# Patient Record
Sex: FEMALE | Race: WHITE | NOT HISPANIC OR LATINO | Employment: OTHER | URBAN - METROPOLITAN AREA
[De-identification: names, ages, dates, MRNs, and addresses within clinical notes are randomized per-mention and may not be internally consistent; named-entity substitution may affect disease eponyms.]

---

## 2019-08-09 ENCOUNTER — OFFICE VISIT CONVERTED (OUTPATIENT)
Dept: INTERNAL MEDICINE | Facility: CLINIC | Age: 69
End: 2019-08-09
Attending: NURSE PRACTITIONER

## 2019-08-09 ENCOUNTER — HOSPITAL ENCOUNTER (OUTPATIENT)
Dept: CARDIOLOGY | Facility: HOSPITAL | Age: 69
Discharge: HOME | End: 2019-08-09
Attending: NURSE PRACTITIONER

## 2019-08-09 ENCOUNTER — CONVERSION ENCOUNTER (OUTPATIENT)
Dept: INTERNAL MEDICINE | Facility: CLINIC | Age: 69
End: 2019-08-09

## 2019-08-12 ENCOUNTER — OFFICE VISIT CONVERTED (OUTPATIENT)
Dept: INTERNAL MEDICINE | Facility: CLINIC | Age: 69
End: 2019-08-12
Attending: NURSE PRACTITIONER

## 2019-08-21 ENCOUNTER — OFFICE VISIT CONVERTED (OUTPATIENT)
Dept: INTERNAL MEDICINE | Facility: CLINIC | Age: 69
End: 2019-08-21
Attending: NURSE PRACTITIONER

## 2019-08-21 ENCOUNTER — HOSPITAL ENCOUNTER (OUTPATIENT)
Dept: OTHER | Facility: HOSPITAL | Age: 69
Discharge: HOME | End: 2019-08-21
Attending: NURSE PRACTITIONER

## 2019-08-23 LAB — BACTERIA UR CULT: NORMAL

## 2019-10-14 ENCOUNTER — HOSPITAL ENCOUNTER (OUTPATIENT)
Dept: OTHER | Facility: HOSPITAL | Age: 69
Discharge: HOME | End: 2019-10-14
Attending: NURSE PRACTITIONER

## 2019-10-14 ENCOUNTER — OFFICE VISIT CONVERTED (OUTPATIENT)
Dept: INTERNAL MEDICINE | Facility: CLINIC | Age: 69
End: 2019-10-14
Attending: NURSE PRACTITIONER

## 2019-10-14 LAB
ALBUMIN SERPL-MCNC: 4.6 G/DL (ref 3.5–5)
ALBUMIN/GLOB SERPL: 1.4 {RATIO} (ref 1.4–2.6)
ALP SERPL-CCNC: 86 U/L (ref 43–160)
ALT SERPL-CCNC: 26 U/L (ref 10–40)
ANION GAP SERPL CALC-SCNC: 22 MMOL/L (ref 8–19)
AST SERPL-CCNC: 22 U/L (ref 15–50)
BILIRUB SERPL-MCNC: 0.54 MG/DL (ref 0.2–1.3)
BUN SERPL-MCNC: 16 MG/DL (ref 5–25)
BUN/CREAT SERPL: 22 {RATIO} (ref 6–20)
CALCIUM SERPL-MCNC: 9.8 MG/DL (ref 8.7–10.4)
CHLORIDE SERPL-SCNC: 99 MMOL/L (ref 99–111)
CHOLEST SERPL-MCNC: 140 MG/DL (ref 107–200)
CHOLEST/HDLC SERPL: 2 {RATIO} (ref 3–6)
CONV CO2: 26 MMOL/L (ref 22–32)
CONV TOTAL PROTEIN: 7.8 G/DL (ref 6.3–8.2)
CREAT UR-MCNC: 0.72 MG/DL (ref 0.5–0.9)
EST. AVERAGE GLUCOSE BLD GHB EST-MCNC: 151 MG/DL
GFR SERPLBLD BASED ON 1.73 SQ M-ARVRAT: >60 ML/MIN/{1.73_M2}
GLOBULIN UR ELPH-MCNC: 3.2 G/DL (ref 2–3.5)
GLUCOSE SERPL-MCNC: 173 MG/DL (ref 65–99)
HBA1C MFR BLD: 6.9 % (ref 3.5–5.7)
HDLC SERPL-MCNC: 71 MG/DL (ref 40–60)
LDLC SERPL CALC-MCNC: 46 MG/DL (ref 70–100)
OSMOLALITY SERPL CALC.SUM OF ELEC: 301 MOSM/KG (ref 273–304)
POTASSIUM SERPL-SCNC: 4.3 MMOL/L (ref 3.5–5.3)
SODIUM SERPL-SCNC: 143 MMOL/L (ref 135–147)
T4 FREE SERPL-MCNC: 1.3 NG/DL (ref 0.9–1.8)
TRIGL SERPL-MCNC: 116 MG/DL (ref 40–150)
TSH SERPL-ACNC: 1.83 M[IU]/L (ref 0.27–4.2)
VLDLC SERPL-MCNC: 23 MG/DL (ref 5–37)

## 2020-01-14 ENCOUNTER — HOSPITAL ENCOUNTER (OUTPATIENT)
Dept: OTHER | Facility: HOSPITAL | Age: 70
Discharge: HOME | End: 2020-01-14
Attending: NURSE PRACTITIONER

## 2020-01-14 ENCOUNTER — CONVERSION ENCOUNTER (OUTPATIENT)
Dept: INTERNAL MEDICINE | Facility: CLINIC | Age: 70
End: 2020-01-14

## 2020-01-14 ENCOUNTER — OFFICE VISIT CONVERTED (OUTPATIENT)
Dept: INTERNAL MEDICINE | Facility: CLINIC | Age: 70
End: 2020-01-14
Attending: NURSE PRACTITIONER

## 2020-03-19 ENCOUNTER — CONVERSION ENCOUNTER (OUTPATIENT)
Dept: CARDIOLOGY | Facility: CLINIC | Age: 70
End: 2020-03-19

## 2020-03-19 ENCOUNTER — OFFICE VISIT CONVERTED (OUTPATIENT)
Dept: CARDIOLOGY | Facility: CLINIC | Age: 70
End: 2020-03-19
Attending: INTERNAL MEDICINE

## 2020-05-12 LAB — SARS-COV-2 RNA SPEC QL NAA+PROBE: NOT DETECTED

## 2020-05-13 ENCOUNTER — HOSPITAL ENCOUNTER (OUTPATIENT)
Dept: GASTROENTEROLOGY | Facility: HOSPITAL | Age: 70
Setting detail: HOSPITAL OUTPATIENT SURGERY
Discharge: HOME | End: 2020-05-13
Attending: INTERNAL MEDICINE

## 2020-05-13 LAB — GLUCOSE BLD-MCNC: 188 MG/DL (ref 65–99)

## 2020-07-21 ENCOUNTER — OFFICE VISIT CONVERTED (OUTPATIENT)
Dept: INTERNAL MEDICINE | Facility: CLINIC | Age: 70
End: 2020-07-21
Attending: NURSE PRACTITIONER

## 2020-07-21 ENCOUNTER — HOSPITAL ENCOUNTER (OUTPATIENT)
Dept: OTHER | Facility: HOSPITAL | Age: 70
Discharge: HOME | End: 2020-07-21
Attending: NURSE PRACTITIONER

## 2020-07-21 LAB
ALBUMIN SERPL-MCNC: 4.5 G/DL (ref 3.5–5)
ALBUMIN/GLOB SERPL: 1.5 {RATIO} (ref 1.4–2.6)
ALP SERPL-CCNC: 91 U/L (ref 43–160)
ALT SERPL-CCNC: 40 U/L (ref 10–40)
ANION GAP SERPL CALC-SCNC: 19 MMOL/L (ref 8–19)
AST SERPL-CCNC: 38 U/L (ref 15–50)
BASOPHILS # BLD AUTO: 0.02 10*3/UL (ref 0–0.2)
BASOPHILS NFR BLD AUTO: 0.3 % (ref 0–3)
BILIRUB SERPL-MCNC: 0.54 MG/DL (ref 0.2–1.3)
BUN SERPL-MCNC: 14 MG/DL (ref 5–25)
BUN/CREAT SERPL: 18 {RATIO} (ref 6–20)
CALCIUM SERPL-MCNC: 9.8 MG/DL (ref 8.7–10.4)
CHLORIDE SERPL-SCNC: 99 MMOL/L (ref 99–111)
CHOLEST SERPL-MCNC: 150 MG/DL (ref 107–200)
CHOLEST/HDLC SERPL: 2.5 {RATIO} (ref 3–6)
CONV ABS IMM GRAN: 0.01 10*3/UL (ref 0–0.2)
CONV CO2: 24 MMOL/L (ref 22–32)
CONV IMMATURE GRAN: 0.2 % (ref 0–1.8)
CONV TOTAL PROTEIN: 7.5 G/DL (ref 6.3–8.2)
CREAT UR-MCNC: 0.77 MG/DL (ref 0.5–0.9)
DEPRECATED RDW RBC AUTO: 39.4 FL (ref 36.4–46.3)
EOSINOPHIL # BLD AUTO: 0.07 10*3/UL (ref 0–0.7)
EOSINOPHIL # BLD AUTO: 1.1 % (ref 0–7)
ERYTHROCYTE [DISTWIDTH] IN BLOOD BY AUTOMATED COUNT: 12.5 % (ref 11.7–14.4)
EST. AVERAGE GLUCOSE BLD GHB EST-MCNC: 192 MG/DL
GFR SERPLBLD BASED ON 1.73 SQ M-ARVRAT: >60 ML/MIN/{1.73_M2}
GLOBULIN UR ELPH-MCNC: 3 G/DL (ref 2–3.5)
GLUCOSE SERPL-MCNC: 213 MG/DL (ref 65–99)
HBA1C MFR BLD: 8.3 % (ref 3.5–5.7)
HCT VFR BLD AUTO: 40.8 % (ref 37–47)
HDLC SERPL-MCNC: 61 MG/DL (ref 40–60)
HGB BLD-MCNC: 13.8 G/DL (ref 12–16)
LDLC SERPL CALC-MCNC: 54 MG/DL (ref 70–100)
LYMPHOCYTES # BLD AUTO: 2.5 10*3/UL (ref 1–5)
LYMPHOCYTES NFR BLD AUTO: 38.8 % (ref 20–45)
MCH RBC QN AUTO: 29.5 PG (ref 27–31)
MCHC RBC AUTO-ENTMCNC: 33.8 G/DL (ref 33–37)
MCV RBC AUTO: 87.2 FL (ref 81–99)
MONOCYTES # BLD AUTO: 0.59 10*3/UL (ref 0.2–1.2)
MONOCYTES NFR BLD AUTO: 9.2 % (ref 3–10)
NEUTROPHILS # BLD AUTO: 3.25 10*3/UL (ref 2–8)
NEUTROPHILS NFR BLD AUTO: 50.4 % (ref 30–85)
NRBC CBCN: 0 % (ref 0–0.7)
OSMOLALITY SERPL CALC.SUM OF ELEC: 293 MOSM/KG (ref 273–304)
PLATELET # BLD AUTO: 210 10*3/UL (ref 130–400)
PMV BLD AUTO: 12.8 FL (ref 9.4–12.3)
POTASSIUM SERPL-SCNC: 3.9 MMOL/L (ref 3.5–5.3)
RBC # BLD AUTO: 4.68 10*6/UL (ref 4.2–5.4)
SODIUM SERPL-SCNC: 138 MMOL/L (ref 135–147)
T4 FREE SERPL-MCNC: 1.2 NG/DL (ref 0.9–1.8)
TRIGL SERPL-MCNC: 177 MG/DL (ref 40–150)
TSH SERPL-ACNC: 1.79 M[IU]/L (ref 0.27–4.2)
VLDLC SERPL-MCNC: 35 MG/DL (ref 5–37)
WBC # BLD AUTO: 6.44 10*3/UL (ref 4.8–10.8)

## 2020-07-22 LAB
CONV CREATININE URINE, RANDOM: 34.8 MG/DL (ref 10–300)
CONV MICROALBUM.,U,RANDOM: <12 MG/L (ref 0–20)
MICROALBUMIN/CREAT UR: 34.5 MG/G{CRE} (ref 0–35)

## 2020-10-21 ENCOUNTER — HOSPITAL ENCOUNTER (OUTPATIENT)
Dept: GENERAL RADIOLOGY | Facility: HOSPITAL | Age: 70
Discharge: HOME | End: 2020-10-21
Attending: NURSE PRACTITIONER

## 2020-10-22 ENCOUNTER — CONVERSION ENCOUNTER (OUTPATIENT)
Dept: CARDIOLOGY | Facility: CLINIC | Age: 70
End: 2020-10-22

## 2020-10-22 ENCOUNTER — CONVERSION ENCOUNTER (OUTPATIENT)
Dept: CARDIOLOGY | Facility: CLINIC | Age: 70
End: 2020-10-22
Attending: INTERNAL MEDICINE

## 2020-10-22 ENCOUNTER — OFFICE VISIT CONVERTED (OUTPATIENT)
Dept: CARDIOLOGY | Facility: CLINIC | Age: 70
End: 2020-10-22
Attending: INTERNAL MEDICINE

## 2021-01-22 ENCOUNTER — HOSPITAL ENCOUNTER (OUTPATIENT)
Dept: URGENT CARE | Facility: CLINIC | Age: 71
Discharge: HOME | End: 2021-01-22
Attending: NURSE PRACTITIONER

## 2021-01-23 LAB — SARS-COV-2 RNA SPEC QL NAA+PROBE: DETECTED

## 2021-01-26 ENCOUNTER — TELEMEDICINE CONVERTED (OUTPATIENT)
Dept: INTERNAL MEDICINE | Facility: CLINIC | Age: 71
End: 2021-01-26
Attending: NURSE PRACTITIONER

## 2021-02-02 ENCOUNTER — OFFICE VISIT CONVERTED (OUTPATIENT)
Dept: INTERNAL MEDICINE | Facility: CLINIC | Age: 71
End: 2021-02-02
Attending: PHYSICIAN ASSISTANT

## 2021-02-02 ENCOUNTER — HOSPITAL ENCOUNTER (OUTPATIENT)
Dept: OTHER | Facility: HOSPITAL | Age: 71
Discharge: HOME | End: 2021-02-02
Attending: PHYSICIAN ASSISTANT

## 2021-05-10 NOTE — PROCEDURES
"   Procedure Note      Patient Name: Jeimy Estevez   Patient ID: [id number]   Sex: Female   YOB: 1950    Primary Care Provider: Minoo LOOMIS   Referring Provider: Minoo LOOMIS    Visit Date: October 22, 2020    Provider: Leonel Triplett MD   Location: [facility]   Location Address: [address] [place] [address], [address]   [place]  [id number]   Location Phone: [phone]          FINAL REPORT   TRANSTHORACIC ECHOCARDIOGRAM REPORT    Diagnosis: CHF (Congestive Heart Failure)   Height: 5'4\" Weight: 195 B/P: 116/56 BSA: 1.9   Tech: BNS   MEASUREMENTS:  RVID (Diastole) : RVID. (NORMAL: 0.7 to 2.4 cm max)   LVID (Systole): 2.8 cm (Diastole): 4.6 cm. (NORMAL: 3.7 - 5.4 cm)   Posterior Wall Thickness (Diastole): 1.3 cm. (NORMAL: 0.8 - 1.1 cm)   Septal Thickness (Diastole): 1.4 cm. (NORMAL: 0.7 - 1.2 cm)   LAID (Systole): 4.1 cm. (NORMAL: 1.9 - 3.8 cm)   Aortic Root Diameter (Diastole): 3.0 cm. (NORMAL: 2.0 - 3.7 cm)   DOPPLER: Continuous-wave, pulse-wave, and color-flow examination of the mitral, aortic, and tricuspid valves was performed. There was trace tricuspid regurgitation with normal estimated PA pressures. No significant stenosis was identified. Doppler flow velocities were normal. E/A ratio is 1.1. DT= 169 msec. IVRT is 56 msec. E/E' is 14.   COMMENTS:  The patient underwent 2-D, M-Mode, and Doppler examination, including pulse-wave, continuous-wave, and color-flow analysis; the study is technically adequate.   FINDINGS:  AORTIC VALVE: Appeared to be normal. Trileaflet with central closure point. No evidence of any obstruction. No regurgitation.   MITRAL VALVE: Appeared to be normal. No evidence of any obstruction. No regurgitation.   TRICUSPID VALVE: Appeared to be normal.   PULMONIC VALVE: Not well seen.   LEFT ATRIUM: Mildly enlarged. LA volume index is 21 mL/m2.   AORTIC ROOT: Appeared to be normal in size.   LEFT VENTRICLE: Appeared to have an overall normal left ventricular " systolic function with a normal EF of 55% with mild left ventricular hypertrophy.   RIGHT ATRIUM: Appeared to be normal; no obvious evidence of intracavity masses or clots.   RIGHT VENTRICLE: Normal size and function.   PERICARDIUM: Unremarkable. No evidence of effusion.   INFERIOR VENA CAVA: Diameter is 1.5 cm.   Fax: 10/26/2020      CONCLUSION:  1.  Normal ejection fraction of 55%.   2.  Mild left ventricular hypertrophy.   3.  Mild left atrial enlargement.   4.  Trace tricuspid regurgitation.      MD ELIZABETH Curtis/vamshi    This note was transcribed by Amaya Saez.  vamshi/elizabeth  The above service was transcribed by Amaya Saez, and I attest to the accuracy of the note.                   Electronically Signed by: Carmelita Saez-, Other -Author on October 26, 2020 09:55:29 AM  Electronically Co-signed by: Leonel Triplett MD -Reviewer on October 28, 2020 05:36:26 PM

## 2021-05-13 NOTE — PROGRESS NOTES
"   Progress Note      Patient Name: Jeimy Estevez   Patient ID: [id number]   Sex: Female   YOB: 1950    Primary Care Provider: Minoo LOOMIS   Referring Provider: Minoo LOOMIS    Visit Date: October 22, 2020    Provider: Leonel Triplett MD   Location: [facility]   Location Address: [address] [place] [address], [address]   [place]  [id number]   Location Phone: [phone]          Chief Complaint     Hypertension.  Congestive heart failure.       History Of Present Illness  REFERRING CARE PROVIDER: Minoo LOOMIS   Jeimy Estevez is a 70 year old /White female with congestive heart failure, diabetes, and underlying hypertension who has been doing well. She has had stable mild lower extremity edema. No problems with shortness of breath or weight gain.   PAST MEDICAL HISTORY: Diabetes mellitus; Dyslipidemia; Hypertension; Polyps.   PSYCHOSOCIAL HISTORY: Denies tobacco use. Denies alcohol use.   CURRENT MEDICATIONS: Metformin 500 mg b.i.d.; nifedipine 60 mg daily simvastatin 40 mg daily; atenolol 50 mg daily; furosemide 40 mg b.i.d.; glimepiride 4 mg b.i.d.; Tresiba 10 units.       Review of Systems  · Cardiovascular  o Admits  o : swelling (feet, ankles, hands)  o Denies  o : palpitations (fast, fluttering, or skipping beats), shortness of breath while walking or lying flat, chest pain or angina pectoris   · Respiratory  o Denies  o : chronic or frequent cough      Vitals  Date Time BP Position Site L\R Cuff Size HR RR TEMP (F) WT  HT  BMI kg/m2 BSA m2 O2 Sat FR L/min FiO2 HC       10/22/2020 10:45 /56 Sitting    64 - R   194lbs 16oz 5'  4\" 33.47 2             Physical Examination  · Constitutional  o Appearance  o : Awake, alert, in no acute distress.   · Eyes  o Conjunctivae  o : Normal.  · Ears, Nose, Mouth and Throat  o Oral Cavity  o :   § Oral Mucosa  § : Normal.  · Neck  o Inspection/Palpation  o : No JVD. Good carotid upstroke. No " thyromegaly.  · Respiratory  o Respiratory  o : Good respiratory effort. Clear to percussion and auscultation.  · Cardiovascular  o Heart  o :   § Auscultation of Heart  § : S1, S2 normal. Regular rate and rhythm without murmurs, gallops, or rubs.  o Peripheral Vascular System  o :   § Extremities  § : Trace lower extremity edema.   · Gastrointestinal  o Abdominal Examination  o : Soft. No tenderness or masses felt. No hepatosplenomegaly. Abdominal aorta is not palpable.          Assessment     ASSESSMENT & PLAN:    1.  Congestive heart failure, by report, possibly diastolic in nature.  No evidence of volume overload.         Echocardiogram pending today.    2.  Hypertension, controlled.  3.  Dyslipidemia.  4.  Diabetes.        Leonel Triplett MD  JH:vm             Electronically Signed by: Aarti Holbrook-, Other -Author on October 27, 2020 08:03:58 AM  Electronically Co-signed by: Leonel Triplett MD -Reviewer on October 28, 2020 05:34:14 PM

## 2021-05-13 NOTE — PROGRESS NOTES
Progress Note      Patient Name: Jeimy Estevez   Patient ID: [id number]   Sex: Female   YOB: 1950    Primary Care Provider: Minoo LOOMIS   Referring Provider: Minoo LOOMIS    Visit Date: July 21, 2020    Provider: EBONIE Jonse   Location: [facility]   Location Address: [address] [place] [address], [address]  [place], [place]  [id number]   Location Phone: [phone]          Chief Complaint  · Follow up  · Diabetes   · Hyperlipidemia       History Of Present Illness  Jeimy Estevez is a 70 year old /White female who presents for evaluation and treatment of:      HTN-  Managed with atenolol and nifedipine. Reports home blood pressure readings 120s/60s. Denies chest pain, blurry vision, headache.     HLD-  Managed with statin, well tolerated. Denies leg cramping. Most recent LDL 46.    CHF-  Managed with Lasix, statin, BB. Dependent edema. Denies shortness of breath, weight gain, orthopnea.    DM2-  Managed by [facility], last visit 3/2020. Most recent HgbA1c 6.8. Reports fasting blood glucose readings 150s. Due to schedule diabetic eye exam, postponed due to COVID19 pandemic. Due for diabetic foot exam and urine microalbumin. Patient not on ACE/ARB, defers as she would like to discuss with [facility]. Considering pneumonia vaccine, would also like to schedule with [facility].       Past Medical History  Disease Name Date Onset Notes   CHF (congestive heart failure) 10/14/2019 --    Colon polyps 10/14/2019 --    Diabetes --  --    Diabetes mellitus, type 2 10/14/2019 --    Essential hypertension 10/14/2019 --    Hyperlipidemia --  --    Hypertension --  --          Past Surgical History  Procedure Name Date Notes   Appendectomy --  --    Colonoscopy 2014 2020 [place], History of Colon Polyps   Gallbladder --  --    Tonsilectomy --  --    Tubal ligation --  --          Medication List  Name Date Started Instructions   atenolol 50 mg oral tablet 07/21/2020  take 1 tablet (50 mg) by oral route once daily   biotin 5,000 mcg oral tablet,disintegrating  dissolve 1 tablet by oral route daily   Blood Glucose Monitoring miscellaneous kit 01/14/2020 use as directed   Blood Glucose Test miscellaneous strip 01/14/2020 use as directed   furosemide 40 mg oral tablet 10/14/2019 take 1 tablet (40 mg) by oral route 2 times per day for 30 days   glimepiride oral  --    ibuprofen 600 mg oral tablet  take 1 tablet (600 mg) by oral route every 8 hours as needed with food   lancets miscellaneous misc 01/14/2020 use as directed   metformin 1,000 mg oral tablet  take 1 tablet by oral route daily   nifedipine 60 mg oral tablet extended release 01/08/2020 take 1 tablet by oral route once daily for 30 days   NuLYTELY with Flavor Packs 420 gram oral recon soln 03/23/2020 take as directed for 1 day   Ozempic 0.25 mg or 0.5 mg(2 mg/1.5 mL) subcutaneous pen injector  inject 0.25 mg by subcutaneous route once weekly on the same day of each week, in the abdomen, thighs, or upper arm rotating injection sites   simvastatin 40 mg oral tablet 05/19/2020 TAKE 1 TABLET BY MOUTH EVERY DAY         Allergy List  Allergen Name Date Reaction Notes   NO KNOWN DRUG ALLERGIES --  --  --          Family Medical History  Disease Name Relative/Age Notes   Heart Disease  --    Cancer, Unspecified  --    No family history of colorectal cancer  --          Social History  Finding Status Start/Stop Quantity Notes   Alcohol Never --/-- --  --    Tobacco Never --/-- --  --          Review of Systems  · Constitutional  o Denies  o : fever, fatigue, weight loss, weight gain  · Eyes  o Denies  o : discharge from eye, impaired vision, blurred vision  · HENT  o Denies  o : headaches, vertigo, lightheadedness  · Cardiovascular  o Admits  o : lower extremity edema  o Denies  o : chest pressure, palpitations  · Respiratory  o Denies  o : shortness of breath, wheezing, cough, dyspnea on exertion  · Gastrointestinal  o Denies  o :  "nausea, vomiting, diarrhea, constipation, abdominal pain  · Psychiatric  o Denies  o : anxiety, depression, suicidal ideation, homicidal ideation      Vitals  Date Time BP Position Site L\R Cuff Size HR RR TEMP (F) WT  HT  BMI kg/m2 BSA m2 O2 Sat HC       03/19/2020 02:07 PM 96/64 Sitting    72 - R   201lbs 0oz 5'  4\" 34.5 2.03     03/19/2020 02:07 /64 Sitting               07/21/2020 11:41 /74 Sitting    66 - R  97.3 196lbs 8oz 5'  4\" 33.73 2.01 96 %          Physical Examination  · Constitutional  o Appearance  o : no acute distress, well-nourished  · Head and Face  o Head  o :   § Inspection  § : atraumatic, normocephalic  · Ears, Nose, Mouth and Throat  o Ears  o :   § External Ears  § : normal  o Nose  o :   § Intranasal Exam  § : nares patent  o Oral Cavity  o :   § Oral Mucosa  § : moist mucous membranes  · Neck  o Thyroid  o : gland size normal, nontender, no nodules or masses present on palpation, thyroid motion normal during swallowing  · Respiratory  o Respiratory Effort  o : breathing comfortably, symmetric chest rise  o Auscultation of Lungs  o : clear to asculatation bilaterally, no wheezes, rales, or rhonchii  · Cardiovascular  o Heart  o :   § Auscultation of Heart  § : regular rate and rhythm, no murmurs, rubs, or gallops  o Peripheral Vascular System  o :   § Extremities  § : trace, nonpitting lower extremity edema  · Lymphatic  o Neck  o : no lymphadenopathy present  o Supraclavicular Nodes  o : no supraclavicular nodes  · Skin and Subcutaneous Tissue  o General Inspection  o : no rashes present, no lesions present, no areas of discoloration  · Neurologic  o Mental Status Examination  o :   § Orientation  § : grossly oriented to person, place and time  o Gait and Station  o :   § Gait Screening  § : normal gait      Figure 1.0: Diabetic Foot Screen             Assessment  · Visit for screening mammogram     V76.12/Z12.31  Order placed for mammogram.  · CHF (congestive heart " failure)     428.0/I50.9  Continue Lasix, statin, BB. Could consider echo in the future as patient is not sure if she has ever had one, does not see cardiology. Call or return to clinic with worsening lower extremity edema, shortness of breath, orthopnea, weight gain. Will continue to monitor.   · Diabetes mellitus, type 2     250.00/E11.9  Well controlled on previous labs. Continue metformin, Ozempic, glimepiride. Encouraged patient to continue to monitor blood glucose levels and to call with consistent elevations/lows. Labs, diabetic foot exam and urine microalbumin in clinic today. She is to schedule diabetic eye exam and will discuss ACE/ARB and pneumonia vaccine with [place] at next follow up appointment. Discussed renal risks with DM2. Will continue to monitor.   · Essential hypertension     401.9/I10  Well controlled, continue atenolol and nifedipine. Encouraged patient to continue to monitor blood pressure at home and to call with consistent elevations. Labs in clinic today. Will continue to monitor.   · Hyperlipidemia     272.4/E78.5  Well controlled, continue statin. Most recent LDL 46. Lipid panel in clinic today.   · Postmenopausal     V49.81/Z78.0  Order placed for DEXA scan.     Problems Reconciled  Plan  · Orders  o Screening Mammography; Bilateral 3D (36217, , 53544) - V76.12/Z12.31 - 07/21/2020  o CBC with Auto Diff [facility] (93530) - 250.00/E11.9 - 07/21/2020  o CMP [facility] (54846) - 250.00/E11.9 - 07/21/2020  o Hgb A1c [facility] (66310) - 250.00/E11.9 - 07/21/2020  o Lipid Panel [facility] (30491) - 250.00/E11.9 - 07/21/2020  o Urine microalbumin (52924) - 250.00/E11.9 - 07/21/2020  o Thyroid Profile (95483, 01664, THYII) - 250.00/E11.9 - 07/21/2020  o Diabetic Foot (Motor and Sensory) Exam Completed [facility] (, , 2028F) - 250.00/E11.9 - 07/21/2020  o ACO-39: Current medications updated and reviewed () - - 07/21/2020  o ACO-14: Influenza immunization administered or previously received () - -  07/21/2020  o ACO-19: Colorectal cancer screening results documented and reviewed (3017F) - - 07/21/2020  o DEXA Bone Density, 1 or more sites, axial skeleton [facility] (79530) - V49.81/Z78.0 - 07/21/2020  · Medications  o atenolol 50 mg oral tablet   SIG: take 1 tablet (50 mg) by oral route once daily   DISP: (90) tablet with 1 refills  Adjusted on 07/21/2020     o Medications have been Reconciled  o Transition of Care or Provider Policy  · Instructions  o Continue blood sugar monitoring daily and record. Bring your log to office visits. Call the office for readings below 70 and above 250 or any complications.  o Daily foot care. Avoid walking barefoot. Annual Dilated Eye Exam.  o Discussed with patient blood pressure monitoring, hemoglobin A1C levels need to be below 7.0, and LDL (Lipid) goals below 70.  o Patient advised to monitor blood pressure (B/P) at home and journal readings. Patient informed that a B/P reading at home of more than 130/80 is considered hypertension. For readings greater [id number]/90 or higher patient is advised to follow up in the office with readings for management. Patient advised to limit sodium intake.  o Advised that cheeses and other sources of dairy fats, animal fats, fast food, and the extras (candy, pastries, pies, doughnuts and cookies) all contain LDL raising nutrients. Advised to increase fruits, vegetables, whole grains, and to monitor portion sizes.   o Take all medications as prescribed/directed.  o Patient was educated/instructed on their diagnosis, treatment and medications prior to discharge from the clinic today.  o Patient instructed to seek medical attention urgently for new or worsening symptoms.  o Call the office with any concerns or questions.  o Chronic conditions reviewed and taken into consideration for today's treatment plan.  · Disposition  o Call or Return if symptoms worsen or persist.  o Follow up in 6 months  o Prescriptions sent to pharmacy  o Labs drawn in  clinic  o Will call patient with results of labs  o Will call patient with results of imaging  o Imaging to be performed outside of clinic            Electronically Signed by: EBONIE Jones -Author on July 21, 2020 04:03:32 PM

## 2021-05-14 VITALS
HEIGHT: 64 IN | DIASTOLIC BLOOD PRESSURE: 56 MMHG | HEART RATE: 64 BPM | WEIGHT: 195 LBS | SYSTOLIC BLOOD PRESSURE: 116 MMHG | BODY MASS INDEX: 33.29 KG/M2

## 2021-05-14 VITALS
HEART RATE: 63 BPM | HEIGHT: 64 IN | WEIGHT: 190.12 LBS | DIASTOLIC BLOOD PRESSURE: 76 MMHG | OXYGEN SATURATION: 98 % | SYSTOLIC BLOOD PRESSURE: 118 MMHG | BODY MASS INDEX: 32.46 KG/M2 | TEMPERATURE: 97.9 F

## 2021-05-14 NOTE — PROGRESS NOTES
Progress Note      Patient Name: Jeimy Estevez   Patient ID: [id number]   Sex: Female   YOB: 1950    Primary Care Provider: Minoo LOOMIS   Referring Provider: Minoo LOOMIS    Visit Date: January 26, 2021    Provider: EBONIE Jones   Location: [facility]   Location Address: [address] [place] [address] [address]  [place], [place]  [id number]   Location Phone: [phone]          Chief Complaint  · + Covid-19      History Of Present Illness  TELEHEALTH TELEPHONE VISIT  Jeimy Estevez is a 70 year old /White female who is presenting for evaluation via telehealth telephone visit. Verbal consent obtained before beginning visit.   Provider spent 18 minutes with the patient during the telehealth visit.   The following staff were present during this visit: EBONIE Jones; Kate Rhoades CMA   Past Medical History/ Overview of Patient Symptoms     Informed patient that as visit is being performed as a Telehealth visit there will be no opportunity to obtain vital signs or perform a physical exam. Due to this there is unfortunately a possibility that things may be missed that would typically be noticed during a traditional visit. Patient is aware of this possibility and agrees to proceed with Telehealth visit. Patient states there is no other person present for this phone call.     Phone call started: [phone]  Phone call ended: 1314    Patient reports she was recently diagnosed with COVID19 pneumonia through [facility] after running a fever for multiple days. Was started on azithromycin, dexamethasone and Tessalon Perles x 3 days ago. Patient states since starting the medication she continues to have intermittent temperature elevations, last fever yesterday of 101. Current temperature 99 without temperature reducing medications. Patient with cough, loss of taste/smell. Drinking well. Plenty of urine output. Denies shortness breath, cough is not productive. Patient  has been concerned about her blood sugar readings, fasting this morning was 285. She has one dose of oral steroid left.       Past Medical History  Disease Name Date Onset Notes   CHF (congestive heart failure) 10/14/2019 --    Colon polyps 10/14/2019 --    Diabetes --  --    Diabetes mellitus, type 2 10/14/2019 --    Essential hypertension 10/14/2019 --    Hyperlipidemia --  --    Hypertension --  --    Screening for breast cancer 10/28/2020 --          Past Surgical History  Procedure Name Date Notes   Appendectomy --  --    Colonoscopy 2014 2020 [place], History of Colon Polyps   Gallbladder --  --    Tonsilectomy --  --    Tubal ligation --  --          Medication List  Name Date Started Instructions   atenolol 50 mg oral tablet 07/21/2020 take 1 tablet (50 mg) by oral route once daily   azithromycin 250 mg oral tablet  take 2 tablets (500 mg) by oral route once daily for 1 day then 1 tablet (250 mg) by oral route once daily for 4 days   biotin 5,000 mcg oral tablet,disintegrating  dissolve 1 tablet by oral route daily   Blood Glucose Monitoring miscellaneous kit 01/14/2020 use as directed   Blood Glucose Test miscellaneous strip 01/14/2020 use as directed   dexamethasone 6 mg oral tablet  take 1 tablet (6 mg) by oral route once daily   furosemide 40 mg oral tablet 09/24/2020 TAKE 1 TABLET(40 MG) BY MOUTH TWICE DAILY   glimepiride oral  --    ibuprofen 600 mg oral tablet  take 1 tablet (600 mg) by oral route every 8 hours as needed with food   lancets miscellaneous misc 01/14/2020 use as directed   metformin 1,000 mg oral tablet  take 1 tablet by oral route daily   nifedipine 60 mg oral tablet extended release 10/05/2020 take 1 tablet by oral route once daily for 90 days   simvastatin 40 mg oral tablet 11/11/2020 TAKE 1 TABLET BY MOUTH EVERY DAY   Tresiba FlexTouch U-100 100 unit/mL (3 mL) subcutaneous insulin pen  inject by subcutaneous route per prescriber's instructions. Insulin dosing requires  individualization.         Allergy List  Allergen Name Date Reaction Notes   NO KNOWN DRUG ALLERGIES --  --  --        Allergies Reconciled  Family Medical History  Disease Name Relative/Age Notes   Heart Disease  --    Cancer, Unspecified  --    No family history of colorectal cancer  --          Social History  Finding Status Start/Stop Quantity Notes   Alcohol Never --/-- --  --    Tobacco Never --/-- --  --          Physical Examination                Assessment  · Diabetes mellitus, type 2     250.00/E11.9  · COVID-19     079.89/U07.1  Continue medications as prescribed by [facility]. Discussed with patient impact of systemic steroids on blood glucose levels and risks/benefits of continuing medication. Would finish course at this time. She is to continue to monitor blood glucose levels closely and will call clinic with consistent elevations. Continue quarantine. Patient to monitor for persistent fever, cough, shortness of breath. Could consider in clinic evaluation with repeat CXR in the future if warranted. Patient and  aware of 24 hour on call service in the event that there are concerns outside of office hours.     Problems Reconciled  Plan  · Orders  o Physician Telephone Evaluation, 11-20 minutes (08444) - - 01/26/2021  · Medications  o Medications have been Reconciled  o Transition of Care or Provider Policy  · Instructions  o Chronic conditions reviewed and taken into consideration for today's treatment plan.  o Take all medications as prescribed/directed.  o Rest. Increase Fluids.  o Call the office with any concerns or questions.  · Disposition  o Call or Return if symptoms worsen or persist.            Electronically Signed by: EBONIE Jones -Author on January 26, 2021 01:33:47 PM

## 2021-05-14 NOTE — PROGRESS NOTES
Progress Note      Patient Name: Jeimy Estevez   Patient ID: [id number]   Sex: Female   YOB: 1950    Primary Care Provider: Minoo LOOMIS   Referring Provider: Minoo LOOMIS    Visit Date: February 2, 2021    Provider: Deborah Mcdonald PA-C   Location: [facility]   Location Address: [address] [place] [address]  [id number]   Location Phone: [phone]          Chief Complaint  · Follow-up  · Covid-19  · Cough      History Of Present Illness  Jeimy Estevez is a 70 year old /White female who presents for evaluation and treatment of:      nasal congestion, cough, shortness of breath.  Her symptoms have improved since they began initially after testing positive for covid on 1/22/21 but they are still bothering her some.  She was seen at [facility] and given a zpak.  She has had some fatigue.  No chest pain, difficulty breathing or fevers.            Past Medical History  Disease Name Date Onset Notes   CHF (congestive heart failure) 10/14/2019 --    Colon polyps 10/14/2019 --    Diabetes --  --    Diabetes mellitus, type 2 10/14/2019 --    Essential hypertension 10/14/2019 --    Hyperlipidemia --  --    Hypertension --  --    Screening for breast cancer 10/28/2020 --          Past Surgical History  Procedure Name Date Notes   Appendectomy --  --    Colonoscopy 2014 2020 [place], History of Colon Polyps   Gallbladder --  --    Tonsilectomy --  --    Tubal ligation --  --          Medication List  Name Date Started Instructions   atenolol 50 mg oral tablet 07/21/2020 take 1 tablet (50 mg) by oral route once daily   biotin 5,000 mcg oral tablet,disintegrating  dissolve 1 tablet by oral route daily   Blood Glucose Monitoring miscellaneous kit 01/14/2020 use as directed   Blood Glucose Test miscellaneous strip 01/14/2020 use as directed   furosemide 40 mg oral tablet 09/24/2020 TAKE 1 TABLET(40 MG) BY MOUTH TWICE DAILY   glimepiride oral  --   "  ibuprofen 600 mg oral tablet  take 1 tablet (600 mg) by oral route every 8 hours as needed with food   lancets miscellaneous misc 01/14/2020 use as directed   metformin 1,000 mg oral tablet  take 1 tablet by oral route daily   nifedipine 60 mg oral tablet extended release 10/05/2020 take 1 tablet by oral route once daily for 90 days   simvastatin 40 mg oral tablet 11/11/2020 TAKE 1 TABLET BY MOUTH EVERY DAY   Tresiba FlexTouch U-100 100 unit/mL (3 mL) subcutaneous insulin pen  inject by subcutaneous route per prescriber's instructions. Insulin dosing requires individualization.         Allergy List  Allergen Name Date Reaction Notes   NO KNOWN DRUG ALLERGIES --  --  --        Allergies Reconciled  Family Medical History  Disease Name Relative/Age Notes   Heart Disease  --    Cancer, Unspecified  --    No family history of colorectal cancer  --          Social History  Finding Status Start/Stop Quantity Notes   Alcohol Never --/-- --  --    Tobacco Never --/-- --  --          Review of Systems  · Constitutional  o Admits  o : fatigue  o Denies  o : fever, weight loss, weight gain  · HENT  o Admits  o : nasal congestion  · Cardiovascular  o Denies  o : lower extremity edema, claudication, chest pressure, palpitations  · Respiratory  o Admits  o : cough  o Denies  o : shortness of breath, wheezing, hemoptysis, dyspnea on exertion  · Gastrointestinal  o Denies  o : nausea, vomiting, diarrhea, constipation, abdominal pain      Vitals  Date Time BP Position Site L\R Cuff Size HR RR TEMP (F) WT  HT  BMI kg/m2 BSA m2 O2 Sat FR L/min FiO2 HC       07/21/2020 11:41 /74 Sitting    66 - R  97.3 196lbs 8oz 5'  4\" 33.73 2.01 96 %  21%    10/22/2020 10:45 /56 Sitting    64 - R   194lbs 16oz 5'  4\" 33.47 2       02/02/2021 10:15 /76 Sitting    63 - R  97.9 190lbs 2oz 5'  4\" 32.63 1.97 98 %  21%          Physical Examination  · Constitutional  o Appearance  o : no acute distress, well-nourished  · Head and " Face  o Head  o :   § Inspection  § : atraumatic, normocephalic  · Ears, Nose, Mouth and Throat  o Ears  o :   § External Ears  § : normal  § Otoscopic Examination  § : tympanic membrane appearance within normal limits bilaterally  o Nose  o :   § Intranasal Exam  § : nares patent  o Oral Cavity  o :   § Oral Mucosa  § : moist mucous membranes  o Throat  o :   § Oropharynx  § : no inflammation or lesions present, tonsils within normal limits  · Respiratory  o Respiratory Effort  o : breathing comfortably, symmetric chest rise  o Auscultation of Lungs  o : decreased breath sounds in RLL  · Cardiovascular  o Heart  o :   § Auscultation of Heart  § : regular rate and rhythm, no murmurs, rubs, or gallops  o Peripheral Vascular System  o :   § Extremities  § : no edema  · Lymphatic  o Neck  o : no lymphadenopathy present  · Skin and Subcutaneous Tissue  o General Inspection  o : no lesions present, no areas of discoloration, skin turgor normal  · Neurologic  o Mental Status Examination  o :   § Orientation  § : grossly oriented to person, place and time  o Gait and Station  o :   § Gait Screening  § : normal gait              Assessment  · Cough     786.2/R05  · History of COVID-19     V12.09/Z86.16  Chest x-ray done in office showing worsening pneumonia. Since patient was recently treated with azithromycin we will go ahead and treat with levofloxacin. Patient is to call or return if symptoms persist or worsen especially if she develops fever, difficulty breathing or other worsening symptoms.    Problems Reconciled  Plan  · Orders  o ACO-39: Current medications updated and reviewed (1159F, ) - - 02/02/2021  o CXR PA/[facility] ([id number]) - 786.2/R05 - 02/02/2021   done in clinic  · Medications  o levofloxacin 750 mg oral tablet   SIG: take 1 tablet (750 mg) by oral route once daily for 7 days   DISP: (7) Tablet with 0 refills  Prescribed on 02/02/2021     o Medications have been Reconciled  o Transition of  Care or Provider Policy  · Instructions  o Take all medications as prescribed/directed.  o Patient was educated/instructed on their diagnosis, treatment and medications prior to discharge from the clinic today.  o Patient instructed to seek medical attention urgently for new or worsening symptoms.  o Call the office with any concerns or questions.  · Disposition  o Call or Return if symptoms worsen or persist.  o follow up as needed  o Medications sent electronically to pharmacy            Electronically Signed by: Deborah Mcdonald PA-C -Author on February 6, 2021 07:57:55 PM

## 2021-05-15 VITALS
DIASTOLIC BLOOD PRESSURE: 68 MMHG | HEART RATE: 70 BPM | SYSTOLIC BLOOD PRESSURE: 108 MMHG | OXYGEN SATURATION: 96 % | HEIGHT: 65 IN | BODY MASS INDEX: 32.57 KG/M2 | WEIGHT: 195.5 LBS | TEMPERATURE: 97.4 F

## 2021-05-15 VITALS
WEIGHT: 191 LBS | HEIGHT: 65 IN | SYSTOLIC BLOOD PRESSURE: 110 MMHG | OXYGEN SATURATION: 97 % | TEMPERATURE: 97.6 F | BODY MASS INDEX: 31.82 KG/M2 | HEART RATE: 70 BPM | DIASTOLIC BLOOD PRESSURE: 64 MMHG

## 2021-05-15 VITALS
TEMPERATURE: 98.2 F | BODY MASS INDEX: 33.51 KG/M2 | HEIGHT: 65 IN | SYSTOLIC BLOOD PRESSURE: 118 MMHG | OXYGEN SATURATION: 95 % | WEIGHT: 201.12 LBS | DIASTOLIC BLOOD PRESSURE: 66 MMHG | HEART RATE: 71 BPM

## 2021-05-15 VITALS
SYSTOLIC BLOOD PRESSURE: 110 MMHG | HEIGHT: 64 IN | WEIGHT: 196.5 LBS | BODY MASS INDEX: 33.55 KG/M2 | DIASTOLIC BLOOD PRESSURE: 74 MMHG | OXYGEN SATURATION: 96 % | HEART RATE: 66 BPM | TEMPERATURE: 97.3 F

## 2021-05-15 VITALS
OXYGEN SATURATION: 96 % | BODY MASS INDEX: 31.68 KG/M2 | SYSTOLIC BLOOD PRESSURE: 108 MMHG | WEIGHT: 190.12 LBS | HEART RATE: 61 BPM | DIASTOLIC BLOOD PRESSURE: 64 MMHG | HEIGHT: 65 IN | TEMPERATURE: 97.2 F

## 2021-05-15 VITALS
BODY MASS INDEX: 32.18 KG/M2 | TEMPERATURE: 97.4 F | SYSTOLIC BLOOD PRESSURE: 98 MMHG | DIASTOLIC BLOOD PRESSURE: 62 MMHG | WEIGHT: 193.12 LBS | HEART RATE: 75 BPM | HEIGHT: 65 IN | OXYGEN SATURATION: 97 %

## 2021-05-15 VITALS
SYSTOLIC BLOOD PRESSURE: 96 MMHG | BODY MASS INDEX: 34.31 KG/M2 | HEART RATE: 72 BPM | DIASTOLIC BLOOD PRESSURE: 64 MMHG | HEIGHT: 64 IN | WEIGHT: 201 LBS

## 2021-05-27 ENCOUNTER — OFFICE VISIT CONVERTED (OUTPATIENT)
Dept: CARDIOLOGY | Facility: CLINIC | Age: 71
End: 2021-05-27
Attending: INTERNAL MEDICINE

## 2021-06-06 NOTE — PROGRESS NOTES
"   Progress Note      Patient Name: Jeimy Estevez   Patient ID: [id number]   Sex: Female   YOB: 1950    Primary Care Provider: Minoo LOOMIS   Referring Provider: Minoo LOOMIS    Visit Date: May 27, 2021    Provider: Leonel Triplett MD   Location: [facility]   Location Address: [address] [place] [address], [address]   [place]  [id number]   Location Phone: [phone]          Chief Complaint     CHF.       History Of Present Illness  REFERRING CARE PROVIDER: Minoo LOOMIS   Jeimy Estevez is a 71 year old /White female with diastolic congestive heart failure, diabetes, and hypertension, who has had some mild increase in weight gain of about 8 pounds, as well as some increased lower extremity edema, more on the right side. Stable shortness of breath. No chest pain problems.   PAST MEDICAL HISTORY: Diabetes mellitus; Dyslipidemia; Hypertension; Polyps.   PSYCHOSOCIAL HISTORY: Denies alcohol use. Denies tobacco use.   CURRENT MEDICATIONS: Medication list was reviewed and is as documented.      ALLERGIES:  No known drug allergies.       Review of Systems  · Cardiovascular  o Admits  o : palpitations (fast, fluttering, or skipping beats), swelling (feet, ankles, hands)  o Denies  o : shortness of breath while walking or lying flat, chest pain or angina pectoris   · Respiratory  o Denies  o : chronic or frequent cough      Vitals  Date Time BP Position Site L\R Cuff Size HR RR TEMP (F) WT  HT  BMI kg/m2 BSA m2 O2 Sat FR L/min FiO2 HC       05/27/2021 10:57 /54 Sitting    68 - R   199lbs 0oz 5'  4\" 34.16 2.02             Physical Examination  · Constitutional  o Appearance  o : Awake, alert, in no acute distress.   · Eyes  o Conjunctivae  o : Normal.  · Ears, Nose, Mouth and Throat  o Oral Cavity  o :   § Oral Mucosa  § : Normal.  · Neck  o Inspection/Palpation  o : No JVD. Good carotid upstroke. No thyromegaly.  · Respiratory  o Respiratory  o : Good respiratory effort. " Clear to percussion and auscultation.  · Cardiovascular  o Heart  o :   § Auscultation of Heart  § : S1, S2 normal. Regular rate and rhythm without murmurs, gallops, or rubs.  o Peripheral Vascular System  o :   § Extremities  § : She has plus 1 lower extremity edema, right greater than left.   · Gastrointestinal  o Abdominal Examination  o : Soft. No tenderness or masses felt. No hepatosplenomegaly. Abdominal aorta is not palpable.          Assessment     ASSESSMENT AND PLAN:  1. Diastolic congestive heart failure with some increased weight gain and edema.  Recommend increasing Lasix to 80 mg in the morning and 40 mg at night for the next three days, then go back to 40 mg b.i.d. afterwards.  Counseled her on weight loss, low sodium diet and a weight log.   2. Hypertension, controlled.  Continue with Nifedipine 60 mg daily for the time being.  This may also be contributing somewhat to her edema issues as well.      MD ELIZABETH Curtis/gabriel               Electronically Signed by: Mari Brooke-, Other -Author on May 30, 2021 09:34:00 AM  Electronically Co-signed by: Leonel Triplett MD -Reviewer on June 1, 2021 02:02:15 PM

## 2021-06-21 RX ORDER — NIFEDIPINE 60 MG/1
TABLET, FILM COATED, EXTENDED RELEASE ORAL
Qty: 90 TABLET | Refills: 0 | Status: SHIPPED | OUTPATIENT
Start: 2021-06-21 | End: 2021-07-06

## 2021-06-21 RX ORDER — NIFEDIPINE 60 MG/1
60 TABLET, FILM COATED, EXTENDED RELEASE ORAL DAILY
COMMUNITY
Start: 2021-03-31 | End: 2021-11-02 | Stop reason: SDUPTHER

## 2021-07-06 RX ORDER — FUROSEMIDE 40 MG/1
1 TABLET ORAL 2 TIMES DAILY
COMMUNITY
Start: 2021-04-24 | End: 2021-12-23

## 2021-07-06 RX ORDER — ATENOLOL 50 MG/1
TABLET ORAL
COMMUNITY
Start: 2021-02-08 | End: 2021-07-27

## 2021-07-06 RX ORDER — SIMVASTATIN 40 MG
40 TABLET ORAL DAILY
COMMUNITY
Start: 2021-05-10 | End: 2021-11-02

## 2021-07-06 RX ORDER — FLUOROURACIL 50 MG/G
CREAM TOPICAL
COMMUNITY
Start: 2021-04-15 | End: 2021-11-02

## 2021-07-06 RX ORDER — NIFEDIPINE 60 MG/1
TABLET, FILM COATED, EXTENDED RELEASE ORAL
Qty: 90 TABLET | Refills: 0 | Status: SHIPPED | OUTPATIENT
Start: 2021-07-06 | End: 2021-12-23

## 2021-07-06 RX ORDER — PEN NEEDLE, DIABETIC 32GX 5/32"
NEEDLE, DISPOSABLE MISCELLANEOUS
COMMUNITY
Start: 2021-04-22

## 2021-07-06 RX ORDER — SEMAGLUTIDE 1.34 MG/ML
0.25 INJECTION, SOLUTION SUBCUTANEOUS WEEKLY
COMMUNITY

## 2021-07-06 RX ORDER — IBUPROFEN 600 MG/1
TABLET ORAL
COMMUNITY
End: 2022-11-30

## 2021-07-06 RX ORDER — GLIMEPIRIDE 4 MG/1
1 TABLET ORAL 2 TIMES DAILY
COMMUNITY
Start: 2021-06-08 | End: 2022-11-30

## 2021-07-06 RX ORDER — INSULIN DEGLUDEC INJECTION 100 U/ML
INJECTION, SOLUTION SUBCUTANEOUS
COMMUNITY
Start: 2021-06-08 | End: 2021-11-02 | Stop reason: ALTCHOICE

## 2021-07-09 ENCOUNTER — TELEPHONE (OUTPATIENT)
Dept: INTERNAL MEDICINE | Facility: CLINIC | Age: 71
End: 2021-07-09

## 2021-07-09 NOTE — TELEPHONE ENCOUNTER
Caller: Jeimy Estevez    Relationship: Self    Best call back number: [phone]    What medication are you requesting: ANTIBIOTICS  What are your current symptoms: UTI SYMPTOMS    How long have you been experiencing symptoms: A WEEK    Have you had these symptoms before:    [x] Yes  [] No    Have you been treated for these symptoms before:   [x] Yes  [] No    If a prescription is needed, what is your preferred pharmacy and phone number:      Additional notes:        [id number] - [place] - [address] AT [facility] [address] - [phone]  - [phone]   [phone]

## 2021-07-09 NOTE — TELEPHONE ENCOUNTER
Spoke with patient and let her know we can do a walk in [facility] to see if she has UTI. Pt states understanding and states she will come in today.

## 2021-07-15 VITALS
HEART RATE: 68 BPM | SYSTOLIC BLOOD PRESSURE: 114 MMHG | BODY MASS INDEX: 33.97 KG/M2 | WEIGHT: 199 LBS | HEIGHT: 64 IN | DIASTOLIC BLOOD PRESSURE: 54 MMHG

## 2021-07-27 RX ORDER — ATENOLOL 50 MG/1
50 TABLET ORAL DAILY
Qty: 90 TABLET | Refills: 1 | Status: SHIPPED | OUTPATIENT
Start: 2021-07-27 | End: 2022-01-24

## 2021-08-06 ENCOUNTER — TELEPHONE (OUTPATIENT)
Dept: INTERNAL MEDICINE | Facility: CLINIC | Age: 71
End: 2021-08-06

## 2021-08-06 NOTE — TELEPHONE ENCOUNTER
Caller: Jeimy Estevez    Relationship: Self    Best call back number: [phone]    Medication needed:   Requested Prescriptions      No prescriptions requested or ordered in this encounter     BLOOD GLUCOSE TEST STRIPS    When do you need the refill by: 08/06/2021    What additional details did the patient provide when requesting the medication: THE PATIENT STATED SHE NEEDS TEST STRIPS FOR HER BLOOD GLUCOSE MONITOR SENT TO THE PHARMACY. SHE IS LEAVING OUT OF TOWN ON 08/08/2021 AND NEEDS THIS ASAP. SHE CANNOT REMEMBER THE NAME OF THE TEST STRIPS OR MONITOR.     Does the patient have less than a 3 day supply:  [x] Yes  [] No    What is the patient's preferred pharmacy: [id number] - [place], [place] - [address] AT [facility] [address] - [phone] [facility] [phone]

## 2021-11-01 PROBLEM — I50.32 CHRONIC HEART FAILURE WITH PRESERVED EJECTION FRACTION (HFPEF) (HCC): Status: ACTIVE | Noted: 2021-11-01

## 2021-11-01 PROBLEM — I10 ESSENTIAL HYPERTENSION: Status: ACTIVE | Noted: 2021-11-01

## 2021-11-01 NOTE — TELEPHONE ENCOUNTER
[facility]MG-CoA Reductase Inhibitors (Statins) Protocol Failed 11/01/2021 06:40 AM   Protocol Details  ALK Phos in past 12 months    ALT in past 12 months    AST in past 12 months    Recent or future visit with authorizing provider        Okay to fill?

## 2021-11-02 ENCOUNTER — OFFICE VISIT (OUTPATIENT)
Dept: CARDIOLOGY | Facility: CLINIC | Age: 71
End: 2021-11-02

## 2021-11-02 VITALS
WEIGHT: 189 LBS | HEIGHT: 64 IN | SYSTOLIC BLOOD PRESSURE: 125 MMHG | BODY MASS INDEX: 32.27 KG/M2 | DIASTOLIC BLOOD PRESSURE: 63 MMHG | HEART RATE: 73 BPM

## 2021-11-02 DIAGNOSIS — I50.32 CHRONIC HEART FAILURE WITH PRESERVED EJECTION FRACTION (HFPEF) (HCC): Primary | ICD-10-CM

## 2021-11-02 DIAGNOSIS — I10 ESSENTIAL HYPERTENSION: ICD-10-CM

## 2021-11-02 PROCEDURE — 99214 OFFICE O/P EST MOD 30 MIN: CPT | Performed by: INTERNAL MEDICINE

## 2021-11-02 PROCEDURE — 93000 ELECTROCARDIOGRAM COMPLETE: CPT | Performed by: INTERNAL MEDICINE

## 2021-11-02 RX ORDER — SIMVASTATIN 40 MG
TABLET ORAL
Qty: 90 TABLET | Refills: 1 | Status: SHIPPED | OUTPATIENT
Start: 2021-11-02 | End: 2022-04-18

## 2021-11-02 NOTE — PATIENT INSTRUCTIONS
"Low-Sodium Eating Plan  Sodium, which is an element that makes up salt, helps you maintain a healthy balance of fluids in your body. Too much sodium can increase your blood pressure and cause fluid and waste to be held in your body.  Your health care provider or dietitian may recommend following this plan if you have high blood pressure (hypertension), kidney disease, liver disease, or heart failure. Eating less sodium can help lower your blood pressure, reduce swelling, and protect your heart, liver, and kidneys.  What are tips for following this plan?  Reading food labels  · The Nutrition Facts label lists the amount of sodium in one serving of the food. If you eat more than one serving, you must multiply the listed amount of sodium by the number of servings.  · Choose foods with less than 140 mg of sodium per serving.  · Avoid foods with 300 mg of sodium or more per serving.  Shopping    · Look for lower-sodium products, often labeled as \"low-sodium\" or \"no salt added.\"  · Always check the sodium content, even if foods are labeled as \"unsalted\" or \"no salt added.\"  · Buy fresh foods.  ? Avoid canned foods and pre-made or frozen meals.  ? Avoid canned, cured, or processed meats.  · Buy breads that have less than 80 mg of sodium per slice.    Cooking    · Eat more home-cooked food and less restaurant, buffet, and fast food.  · Avoid adding salt when cooking. Use salt-free seasonings or herbs instead of table salt or sea salt. Check with your health care provider or pharmacist before using salt substitutes.  · Cook with plant-based oils, such as canola, sunflower, or olive oil.    Meal planning  · When eating at a restaurant, ask that your food be prepared with less salt or no salt, if possible. Avoid dishes labeled as brined, pickled, cured, smoked, or made with soy sauce, miso, or teriyaki sauce.  · Avoid foods that contain MSG (monosodium glutamate). MSG is sometimes added to Chinese food, bouillon, and some " "canned foods.  · Make meals that can be grilled, baked, poached, roasted, or steamed. These are generally made with less sodium.  General information  Most people on this plan should limit their sodium intake to 1,500-2,000 mg (milligrams) of sodium each day.  What foods should I eat?  Fruits  Fresh, frozen, or canned fruit. Fruit juice.  Vegetables  Fresh or frozen vegetables. \"No salt added\" canned vegetables. \"No salt added\" tomato sauce and paste. Low-sodium or reduced-sodium tomato and vegetable juice.  Grains  Low-sodium cereals, including oats, puffed wheat and rice, and shredded wheat. Low-sodium crackers. Unsalted rice. Unsalted pasta. Low-sodium bread. Whole-grain breads and whole-grain pasta.  Meats and other proteins  Fresh or frozen (no salt added) meat, poultry, seafood, and fish. Low-sodium canned tuna and salmon. Unsalted nuts. Dried peas, beans, and lentils without added salt. Unsalted canned beans. Eggs. Unsalted nut butters.  Dairy  Milk. Soy milk. Cheese that is naturally low in sodium, such as ricotta cheese, fresh mozzarella, or Swiss cheese. Low-sodium or reduced-sodium cheese. Cream cheese. Yogurt.  Seasonings and condiments  Fresh and dried herbs and spices. Salt-free seasonings. Low-sodium mustard and ketchup. Sodium-free salad dressing. Sodium-free light mayonnaise. Fresh or refrigerated horseradish. Lemon juice. Vinegar.  Other foods  Homemade, reduced-sodium, or low-sodium soups. Unsalted popcorn and pretzels. Low-salt or salt-free chips.  The items listed above may not be a complete list of foods and beverages you can eat. Contact a dietitian for more information.  What foods should I avoid?  Vegetables  Sauerkraut, pickled vegetables, and relishes. Olives. French fries. Onion rings. Regular canned vegetables (not low-sodium or reduced-sodium). Regular canned tomato sauce and paste (not low-sodium or reduced-sodium). Regular tomato and vegetable juice (not low-sodium or reduced-sodium). " Frozen vegetables in sauces.  Grains  Instant hot cereals. Bread stuffing, pancake, and biscuit mixes. Croutons. Seasoned rice or pasta mixes. Noodle soup cups. Boxed or frozen macaroni and cheese. Regular salted crackers. Self-rising flour.  Meats and other proteins  Meat or fish that is salted, canned, smoked, spiced, or pickled. Precooked or cured meat, such as sausages or meat loaves. Rod. Ham. Pepperoni. Hot dogs. Corned beef. Chipped beef. Salt pork. Jerky. Pickled herring. Anchovies and sardines. Regular canned tuna. Salted nuts.  Dairy  Processed cheese and cheese spreads. Hard cheeses. Cheese curds. Blue cheese. Feta cheese. String cheese. Regular cottage cheese. Buttermilk. Canned milk.  Fats and oils  Salted butter. Regular margarine. Ghee. Rod fat.  Seasonings and condiments  Onion salt, garlic salt, seasoned salt, table salt, and sea salt. Canned and packaged gravies. Worcestershire sauce. Tartar sauce. Barbecue sauce. Teriyaki sauce. Soy sauce, including reduced-sodium. Steak sauce. Fish sauce. Oyster sauce. Cocktail sauce. Horseradish that you find on the shelf. Regular ketchup and mustard. Meat flavorings and tenderizers. Bouillon cubes. Hot sauce. Pre-made or packaged marinades. Pre-made or packaged taco seasonings. Relishes. Regular salad dressings. Salsa.  Other foods  Salted popcorn and pretzels. Corn chips and puffs. Potato and tortilla chips. Canned or dried soups. Pizza. Frozen entrees and pot pies.  The items listed above may not be a complete list of foods and beverages you should avoid. Contact a dietitian for more information.  Summary  · Eating less sodium can help lower your blood pressure, reduce swelling, and protect your heart, liver, and kidneys.  · Most people on this plan should limit their sodium intake to 1,500-2,000 mg (milligrams) of sodium each day.  · Canned, boxed, and frozen foods are high in sodium. Restaurant foods, fast foods, and pizza are also very high in sodium.  You also get sodium by adding salt to food.  · Try to cook at home, eat more fresh fruits and vegetables, and eat less fast food and canned, processed, or prepared foods.  This information is not intended to replace advice given to you by your health care provider. Make sure you discuss any questions you have with your health care provider.  Document Revised: 01/22/2021 Document Reviewed: 11/18/2020  [employer] Patient Education © 2021 [employer]

## 2021-11-02 NOTE — ASSESSMENT & PLAN NOTE
Patient's blood pressures well controlled on atenolol 50 and nifedipine 60 daily.  Recommend keeping a home blood pressure log and low-sodium

## 2021-11-02 NOTE — PROGRESS NOTES
Chief Complaint  Hypertension, Congestive Heart Failure, and no refills needed    Subjective    Patient is doing well she has not been having any worsening problems with shortness of breath stable mild lower extremity edema.  She does report some intermittent back pain between her shoulders underneath her shoulder blades.  Last for a few minutes no aggravating or alleviating factors  Past Medical History:   Diagnosis Date   • CHF (congestive heart failure) (HCC)    • Diabetes mellitus (HCC)    • Hyperlipidemia    • Hypertension    • Poor circulation     seems to have resolved         Current Outpatient Medications:   •  atenolol (TENORMIN) 50 MG tablet, Take 1 tablet by mouth Daily., Disp: 90 tablet, Rfl: 1  •  BD Pen Needle Kathy 2nd Gen 32G X 4 MM misc, USE ONCE DAILY WITH INJECTIONS, Disp: , Rfl:   •  Biotin 5 MG tablet dispersible, biotin 5,000 mcg oral tablet,disintegrating dissolve  1 tablet by oral route daily   Active, Disp: , Rfl:   •  furosemide (LASIX) 40 MG tablet, Take 1 tablet by mouth 2 (Two) Times a Day., Disp: , Rfl:   •  glimepiride (AMARYL) 4 MG tablet, Take 1 tablet by mouth 2 (Two) Times a Day., Disp: , Rfl:   •  glucose blood test strip, 100 each by Other route As Needed (Blood Sugar Monitoring.). Test QID PRN Dx: E11.9, Disp: 100 each, Rfl: 2  •  ibuprofen (ADVIL,MOTRIN) 600 MG tablet, ibuprofen 600 mg oral tablet take 1 tablet (600 mg) by oral route every 8 hours as needed with food   Active, Disp: , Rfl:   •  metFORMIN (GLUCOPHAGE) 500 MG tablet, Take 1,000 mg by mouth 2 (Two) Times a Day., Disp: , Rfl:   •  NIFEdipine CC (ADALAT CC) 60 MG 24 hr tablet, TAKE 1 TABLET BY MOUTH EVERY DAY, Disp: 90 tablet, Rfl: 0  •  Semaglutide,0.25 or 0.5MG/DOS, (Ozempic, 0.25 or 0.5 MG/DOSE,) 2 MG/1.5ML solution pen-injector, Inject 0.25 mg under the skin into the appropriate area as directed 1 (One) Time Per Week., Disp: , Rfl:   •  simvastatin (ZOCOR) 40 MG tablet, TAKE 1 TABLET BY MOUTH EVERY DAY, Disp: 90  "tablet, Rfl: 1    Medications Discontinued During This Encounter   Medication Reason   • fluorouracil (EFUDEX) 5 % cream *Therapy completed   • methylPREDNISolone (MEDROL) 4 MG dose pack *Therapy completed   • NIFEdipine CC (ADALAT CC) 60 MG 24 hr tablet Duplicate order   • Tresiba FlexTouch 100 UNIT/ML solution pen-injector injection Discontinued by another clinician     No Known Allergies     Social History     Tobacco Use   • Smoking status: Never Smoker   • Smokeless tobacco: Never Used   Vaping Use   • Vaping Use: Never used   Substance Use Topics   • Alcohol use: Never   • Drug use: Never       Family History   Problem Relation Age of Onset   • Heart attack Mother    • No Known Problems Father    • Diabetes Paternal Grandmother         Objective     /63   Pulse 73   Ht 162.6 cm (64\")   Wt 85.7 kg (189 lb)   BMI 32.44 kg/m²       Physical Exam    General Appearance:   · no acute distress  · Alert and oriented x3  HENT:   · lips not cyanotic  · Atraumatic  Neck:  · No jvd   · supple  Respiratory:  · no respiratory distress  · normal breath sounds  · no rales  Cardiovascular:  · Regular rate and rhythm  · no S3, no S4   · no murmur  · no rub  Extremities  · No cyanosis  · lower extremity edema: none    Skin:   · warm, dry  · No rashes      Result Review :     No results found for: PROBNP       Lab Results   Component Value Date    TSH 1.790 07/21/2020      Lab Results   Component Value Date    FREET4 1.2 07/21/2020      No results found for: DDIMERQUANT  No results found for: MG   No results found for: DIGOXIN   No results found for: TROPONINT        Lipid Panel    Lipid Panel 5/11/21   Total Cholesterol 166   Triglycerides 206 (A)   HDL Cholesterol 61   LDL Cholesterol  64   (A) Abnormal value       Comments are available for some flowsheets but are not being displayed.           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 Chronic heart failure with preserved ejection fraction (HFpEF) ([facility]) (Primary)  Assessment & Plan:  Patient with weight down 2 pounds from last visit stable symptoms chronic mild lower extremity edema continue the current dose of Lasix.  Counseled patient on keeping a daily weight log as well as low-sodium diet      2. Essential hypertension  Assessment & Plan:  Patient's blood pressures well controlled on atenolol 50 and nifedipine 60 daily.  Recommend keeping a home blood pressure log and low-sodium      Other orders  -     ECG 12 Lead          Follow Up     Return in about 6 months (around 5/2/2022) for Follow with Coretta Gonzalez.          Patient was given instructions and counseling regarding her condition or for health maintenance advice. Please see specific information pulled into the AVS if appropriate.

## 2021-11-29 ENCOUNTER — OFFICE VISIT (OUTPATIENT)
Dept: INTERNAL MEDICINE | Facility: CLINIC | Age: 71
End: 2021-11-29

## 2021-11-29 VITALS
OXYGEN SATURATION: 97 % | SYSTOLIC BLOOD PRESSURE: 116 MMHG | HEIGHT: 64 IN | WEIGHT: 187.6 LBS | HEART RATE: 70 BPM | DIASTOLIC BLOOD PRESSURE: 78 MMHG | TEMPERATURE: 96.4 F | BODY MASS INDEX: 32.03 KG/M2

## 2021-11-29 DIAGNOSIS — E11.65 TYPE 2 DIABETES MELLITUS WITH HYPERGLYCEMIA, WITHOUT LONG-TERM CURRENT USE OF INSULIN (HCC): ICD-10-CM

## 2021-11-29 DIAGNOSIS — E78.5 HYPERLIPIDEMIA, UNSPECIFIED HYPERLIPIDEMIA TYPE: ICD-10-CM

## 2021-11-29 DIAGNOSIS — I10 ESSENTIAL HYPERTENSION: ICD-10-CM

## 2021-11-29 DIAGNOSIS — Z00.00 ENCOUNTER FOR SUBSEQUENT ANNUAL WELLNESS VISIT (AWV) IN MEDICARE PATIENT: Primary | ICD-10-CM

## 2021-11-29 DIAGNOSIS — I50.32 CHRONIC DIASTOLIC (CONGESTIVE) HEART FAILURE (HCC): ICD-10-CM

## 2021-11-29 DIAGNOSIS — Z12.31 VISIT FOR SCREENING MAMMOGRAM: ICD-10-CM

## 2021-11-29 LAB
ALBUMIN SERPL-MCNC: 4.5 G/DL (ref 3.5–5.2)
ALBUMIN/GLOB SERPL: 1.6 G/DL
ALP SERPL-CCNC: 74 U/L (ref 39–117)
ALT SERPL W P-5'-P-CCNC: 34 U/L (ref 1–33)
ANION GAP SERPL CALCULATED.3IONS-SCNC: 7.6 MMOL/L (ref 5–15)
AST SERPL-CCNC: 30 U/L (ref 1–32)
BASOPHILS # BLD AUTO: 0.01 10*3/MM3 (ref 0–0.2)
BASOPHILS NFR BLD AUTO: 0.1 % (ref 0–1.5)
BILIRUB SERPL-MCNC: 0.4 MG/DL (ref 0–1.2)
BUN SERPL-MCNC: 13 MG/DL (ref 8–23)
BUN/CREAT SERPL: 19.4 (ref 7–25)
CALCIUM SPEC-SCNC: 9.5 MG/DL (ref 8.6–10.5)
CHLORIDE SERPL-SCNC: 100 MMOL/L (ref 98–107)
CHOLEST SERPL-MCNC: 137 MG/DL (ref 0–200)
CO2 SERPL-SCNC: 30.4 MMOL/L (ref 22–29)
CREAT SERPL-MCNC: 0.67 MG/DL (ref 0.57–1)
DEPRECATED RDW RBC AUTO: 37.9 FL (ref 37–54)
EOSINOPHIL # BLD AUTO: 0.08 10*3/MM3 (ref 0–0.4)
EOSINOPHIL NFR BLD AUTO: 1.2 % (ref 0.3–6.2)
ERYTHROCYTE [DISTWIDTH] IN BLOOD BY AUTOMATED COUNT: 12.4 % (ref 12.3–15.4)
GFR SERPL CREATININE-BSD FRML MDRD: 87 ML/MIN/1.73
GLOBULIN UR ELPH-MCNC: 2.9 GM/DL
GLUCOSE SERPL-MCNC: 136 MG/DL (ref 65–99)
HBA1C MFR BLD: 7.81 % (ref 4.8–5.6)
HCT VFR BLD AUTO: 39.4 % (ref 34–46.6)
HDLC SERPL-MCNC: 60 MG/DL (ref 40–60)
HGB BLD-MCNC: 14.1 G/DL (ref 12–15.9)
IMM GRANULOCYTES # BLD AUTO: 0.01 10*3/MM3 (ref 0–0.05)
IMM GRANULOCYTES NFR BLD AUTO: 0.1 % (ref 0–0.5)
LDLC SERPL CALC-MCNC: 56 MG/DL (ref 0–100)
LDLC/HDLC SERPL: 0.88 {RATIO}
LYMPHOCYTES # BLD AUTO: 3.28 10*3/MM3 (ref 0.7–3.1)
LYMPHOCYTES NFR BLD AUTO: 48.7 % (ref 19.6–45.3)
MCH RBC QN AUTO: 30.5 PG (ref 26.6–33)
MCHC RBC AUTO-ENTMCNC: 35.8 G/DL (ref 31.5–35.7)
MCV RBC AUTO: 85.1 FL (ref 79–97)
MONOCYTES # BLD AUTO: 0.7 10*3/MM3 (ref 0.1–0.9)
MONOCYTES NFR BLD AUTO: 10.4 % (ref 5–12)
NEUTROPHILS NFR BLD AUTO: 2.65 10*3/MM3 (ref 1.7–7)
NEUTROPHILS NFR BLD AUTO: 39.5 % (ref 42.7–76)
NRBC BLD AUTO-RTO: 0 /100 WBC (ref 0–0.2)
PLATELET # BLD AUTO: 222 10*3/MM3 (ref 140–450)
PMV BLD AUTO: 12.2 FL (ref 6–12)
POTASSIUM SERPL-SCNC: 4 MMOL/L (ref 3.5–5.2)
PROT SERPL-MCNC: 7.4 G/DL (ref 6–8.5)
RBC # BLD AUTO: 4.63 10*6/MM3 (ref 3.77–5.28)
SODIUM SERPL-SCNC: 138 MMOL/L (ref 136–145)
TRIGL SERPL-MCNC: 121 MG/DL (ref 0–150)
TSH SERPL DL<=0.05 MIU/L-ACNC: 2.44 UIU/ML (ref 0.27–4.2)
VLDLC SERPL-MCNC: 21 MG/DL (ref 5–40)
WBC NRBC COR # BLD: 6.73 10*3/MM3 (ref 3.4–10.8)

## 2021-11-29 PROCEDURE — 82043 UR ALBUMIN QUANTITATIVE: CPT | Performed by: NURSE PRACTITIONER

## 2021-11-29 PROCEDURE — 85025 COMPLETE CBC W/AUTO DIFF WBC: CPT | Performed by: NURSE PRACTITIONER

## 2021-11-29 PROCEDURE — 84443 ASSAY THYROID STIM HORMONE: CPT | Performed by: NURSE PRACTITIONER

## 2021-11-29 PROCEDURE — 80061 LIPID PANEL: CPT | Performed by: NURSE PRACTITIONER

## 2021-11-29 PROCEDURE — 80053 COMPREHEN METABOLIC PANEL: CPT | Performed by: NURSE PRACTITIONER

## 2021-11-29 PROCEDURE — 96160 PT-FOCUSED HLTH RISK ASSMT: CPT | Performed by: NURSE PRACTITIONER

## 2021-11-29 PROCEDURE — 1170F FXNL STATUS ASSESSED: CPT | Performed by: NURSE PRACTITIONER

## 2021-11-29 PROCEDURE — G0438 PPPS, INITIAL VISIT: HCPCS | Performed by: NURSE PRACTITIONER

## 2021-11-29 PROCEDURE — 1126F AMNT PAIN NOTED NONE PRSNT: CPT | Performed by: NURSE PRACTITIONER

## 2021-11-29 PROCEDURE — 1159F MED LIST DOCD IN RCRD: CPT | Performed by: NURSE PRACTITIONER

## 2021-11-29 PROCEDURE — 36415 COLL VENOUS BLD VENIPUNCTURE: CPT | Performed by: NURSE PRACTITIONER

## 2021-11-29 PROCEDURE — 99214 OFFICE O/P EST MOD 30 MIN: CPT | Performed by: NURSE PRACTITIONER

## 2021-11-29 PROCEDURE — 83036 HEMOGLOBIN GLYCOSYLATED A1C: CPT | Performed by: NURSE PRACTITIONER

## 2021-11-29 NOTE — ASSESSMENT & PLAN NOTE
Basic labs in clinic today. Encouraged routine dental and eye exams. Discussed age appropriate immunizations, screenings, nutrition and exercise. Age appropriate handout provided.    
Continue Lasix, statin, BB. Follow up with cardiology as scheduled. Will continue to monitor. Could consider repeat echo in the future if warranted.  
Continue metformin, glimepiride, Ozempic. Repeat A1c in clinic today. Encouraged patient to continue to monitor blood glucose levels and to call with consistent elevations. Repeat labs in clinic today. Diabetic eye exam: 1/2021. Diabetic foot exam: 2020. Urine microalbumin: 2020. Renal protection: Declines. Pneumonia vaccination: Up to date.      
Well controlled, continue atenolol, nifedipine. Encouraged patient to continue to monitor blood pressure at home and to call or return to clinic with consistent elevations greater than 130/80. Labs in clinic today to include CBC, CMP.    
Well controlled, continue statin. Most recent LDL 54. Lipid panel with labs.    
Quality 110: Preventive Care And Screening: Influenza Immunization: Influenza immunization was not ordered or administered, reason not given
Detail Level: Detailed

## 2021-11-29 NOTE — PROGRESS NOTES
The ABCs of the Annual Wellness Visit  Subsequent Medicare Wellness Visit    Chief Complaint   Patient presents with   • Medicare Wellness-subsequent      Subjective    History of Present Illness:  Jeimy Estevez is a 71 y.o. female who presents for a Subsequent Medicare Wellness Visit.    HTN-  Managed with atenolol and nifedipine. She does not check her blood pressure at home. Denies chest pain, blurry vision, headache, leg swelling.     HLD-  Managed with statin, well tolerated. Denies leg cramping. Most recent LDL 54.    CHF-  Managed with Lasix, statin, BB. Denies shortness of breath, leg swelling, weight gain, orthopnea. Scheduled with Dr. Triplett every 6 months. Unsure of last echo.     DM2-  Managed by [facility] with metformin, glimepiride and Ozempic. Most recent HgbA1c 7.8. Reports fasting blood glucose readings 200s. Denies lows.  Diabetic eye exam: 1/2021. Diabetic foot exam: 2020. Urine microalbumin: 2020. Renal protection: Declines. Pneumonia vaccination: Up to date.    Mammogram: 10/2020, agreeable to repeat  DEXA: 10/2020  Colonoscopy: 5/2020, repeat 2025  Influenza vaccination: Will get at [facility]  COVID19 vaccination: Declines  Shingles vaccination: Declines      The following portions of the patient's history were reviewed and   updated as appropriate: allergies, current medications, past family history, past medical history, past social history, past surgical history and problem list.    Compared to one year ago, the patient feels her physical   health is better.    Compared to one year ago, the patient feels her mental   health is the same.    Recent Hospitalizations:  She was not admitted to the hospital during the last year.       Current Medical Providers:  Patient Care Team:  Minoo Shirley APRN as PCP - General (Nurse Practitioner)    Outpatient Medications Prior to Visit   Medication Sig Dispense Refill   • atenolol (TENORMIN) 50 MG tablet Take 1 tablet by mouth Daily. 90 tablet 1   •  BD Pen Needle Kathy 2nd Gen 32G X 4 MM misc USE ONCE DAILY WITH INJECTIONS     • Biotin 5 MG tablet dispersible biotin 5,000 mcg oral tablet,disintegrating dissolve  1 tablet by oral route daily   Active     • furosemide (LASIX) 40 MG tablet Take 1 tablet by mouth 2 (Two) Times a Day.     • glimepiride (AMARYL) 4 MG tablet Take 1 tablet by mouth 2 (Two) Times a Day.     • glucose blood test strip 100 each by Other route As Needed (Blood Sugar Monitoring.). Test QID PRN Dx: E11.9 100 each 2   • ibuprofen (ADVIL,MOTRIN) 600 MG tablet ibuprofen 600 mg oral tablet take 1 tablet (600 mg) by oral route every 8 hours as needed with food   Active     • metFORMIN (GLUCOPHAGE) 500 MG tablet Take 1,000 mg by mouth 2 (Two) Times a Day.     • NIFEdipine CC (ADALAT CC) 60 MG 24 hr tablet TAKE 1 TABLET BY MOUTH EVERY DAY 90 tablet 0   • Semaglutide,0.25 or 0.5MG/DOS, (Ozempic, 0.25 or 0.5 MG/DOSE,) 2 MG/1.5ML solution pen-injector Inject 0.25 mg under the skin into the appropriate area as directed 1 (One) Time Per Week.     • simvastatin (ZOCOR) 40 MG tablet TAKE 1 TABLET BY MOUTH EVERY DAY 90 tablet 1     No facility-administered medications prior to visit.       No opioid medication identified on active medication list. I have reviewed chart for other potential  high risk medication/s and harmful drug interactions in the elderly.          Aspirin is not on active medication list.  Aspirin use is not indicated based on review of current medical condition/s. Risk of harm outweighs potential benefits.  .    Patient Active Problem List   Diagnosis   • Chronic diastolic (congestive) heart failure (HCC)   • Essential hypertension   • Type 2 diabetes mellitus with hyperglycemia, without long-term current use of insulin (HCC)   • Encounter for subsequent annual wellness visit (AWV) in Medicare patient   • Hyperlipidemia     Advance Care Planning  Advance Directive is not on file.  ACP discussion was held with the patient during this  "visit. Patient has an advance directive (not in EMR), copy requested.          Objective    Vitals:    11/29/21 1026   BP: 116/78   BP Location: Left arm   Patient Position: Sitting   Cuff Size: Adult   Pulse: 70   Temp: 96.4 °F (35.8 °C)   TempSrc: Temporal   SpO2: 97%   Weight: 85.1 kg (187 lb 9.6 oz)   Height: 162.6 cm (64\")   PainSc: 0-No pain     BMI Readings from Last 1 Encounters:   11/29/21 32.20 kg/m²   BMI is above normal parameters. Recommendations include: educational material    Does the patient have evidence of cognitive impairment? No    Physical Exam            HEALTH RISK ASSESSMENT    Smoking Status:  Social History     Tobacco Use   Smoking Status Never Smoker   Smokeless Tobacco Never Used     Alcohol Consumption:  Social History     Substance and Sexual Activity   Alcohol Use Never     Fall Risk Screen:    REJI Fall Risk Assessment was completed, and patient is at LOW risk for falls.Assessment completed on:11/29/2021    Depression Screening:  PHQ-2/PHQ-9 Depression Screening 11/29/2021   Little interest or pleasure in doing things 0   Feeling down, depressed, or hopeless 0   Total Score 0       Health Habits and Functional and Cognitive Screening:  Functional & Cognitive Status 11/29/2021   Do you have difficulty preparing food and eating? No   Do you have difficulty bathing yourself, getting dressed or grooming yourself? No   Do you have difficulty using the toilet? No   Do you have difficulty moving around from place to place? No   Do you have trouble with steps or getting out of a bed or a chair? No   Current Diet Well Balanced Diet   Dental Exam Up to date   Eye Exam Up to date   Exercise (times per week) 3 times per week   Current Exercises Include Treadmill;Light Weights   Do you need help using the phone?  No   Are you deaf or do you have serious difficulty hearing?  No   Do you need help with transportation? No   Do you need help shopping? No   Do you need help preparing meals?  No "   Do you need help with housework?  No   Do you need help with laundry? No   Do you need help taking your medications? No   Do you need help managing money? No   Do you ever drive or ride in a car without wearing a seat belt? No   Have you felt unusual stress, anger or loneliness in the last month? No   Who do you live with? Spouse   If you need help, do you have trouble finding someone available to you? No   Have you been bothered in the last four weeks by sexual problems? No   Do you have difficulty concentrating, remembering or making decisions? No       Age-appropriate Screening Schedule:  Refer to the list below for future screening recommendations based on patient's age, sex and/or medical conditions. Orders for these recommended tests are listed in the plan section. The patient has been provided with a written plan.    Health Maintenance   Topic Date Due   • TDAP/TD VACCINES (1 - Tdap) Never done   • ZOSTER VACCINE (1 of 2) Never done   • DIABETIC FOOT EXAM  07/21/2021   • URINE MICROALBUMIN  07/21/2021   • INFLUENZA VACCINE  08/01/2021   • DIABETIC EYE EXAM  02/04/2022   • HEMOGLOBIN A1C  03/14/2022   • LIPID PANEL  05/11/2022   • DXA SCAN  10/21/2022   • MAMMOGRAM  10/28/2022              Assessment/Plan   CMS Preventative Services Quick Reference  Risk Factors Identified During Encounter  Cardiovascular Disease  Immunizations Discussed/Encouraged (specific Immunizations; Influenza, Pneumococcal 23, Shingrix and COVID19  The above risks/problems have been discussed with the patient.  Follow up actions/plans if indicated are seen below in the Assessment/Plan Section.  Pertinent information has been shared with the patient in the After Visit Summary.    Diagnoses and all orders for this visit:    1. Encounter for subsequent annual wellness visit (AWV) in Medicare patient (Primary)  Assessment & Plan:  Basic labs in clinic today. Encouraged routine dental and eye exams. Discussed age appropriate immunizations,  screenings, nutrition and exercise. Age appropriate handout provided.        2. Type 2 diabetes mellitus with hyperglycemia, without long-term current use of insulin (HCC)  Assessment & Plan:  Continue metformin, glimepiride, Ozempic. Repeat A1c in clinic today. Encouraged patient to continue to monitor blood glucose levels and to call with consistent elevations. Repeat labs in clinic today. Diabetic eye exam: 1/2021. Diabetic foot exam: 2020. Urine microalbumin: 2020. Renal protection: Declines. Pneumonia vaccination: Up to date.        Orders:  -     CBC & Differential  -     Comprehensive Metabolic Panel  -     Lipid Panel  -     TSH  -     MicroAlbumin, Urine, Random - Urine, Clean Catch  -     Hemoglobin A1c    3. Hyperlipidemia, unspecified hyperlipidemia type  Assessment & Plan:  Well controlled, continue statin. Most recent LDL 54. Lipid panel with labs.      Orders:  -     Lipid Panel    4. Essential hypertension  Assessment & Plan:  Well controlled, continue atenolol, nifedipine. Encouraged patient to continue to monitor blood pressure at home and to call or return to clinic with consistent elevations greater than 130/80. Labs in clinic today to include CBC, CMP.        5. Chronic diastolic (congestive) heart failure (HCC)  Assessment & Plan:  Continue Lasix, statin, BB. Follow up with cardiology as scheduled. Will continue to monitor. Could consider repeat echo in the future if warranted.      6. Visit for screening mammogram  -     Mammo Screening Bilateral With CAD; Future      Follow Up:   Return in about 6 months (around 5/29/2022).     An After Visit Summary and PPPS were made available to the patient.

## 2021-11-30 LAB — ALBUMIN UR-MCNC: 4.5 MG/DL

## 2021-12-23 RX ORDER — FUROSEMIDE 40 MG/1
TABLET ORAL
Qty: 180 TABLET | Refills: 0 | Status: SHIPPED | OUTPATIENT
Start: 2021-12-23 | End: 2022-04-18

## 2021-12-23 RX ORDER — NIFEDIPINE 60 MG/1
TABLET, FILM COATED, EXTENDED RELEASE ORAL
Qty: 90 TABLET | Refills: 0 | Status: SHIPPED | OUTPATIENT
Start: 2021-12-23 | End: 2022-03-10

## 2022-01-05 RX ORDER — BLOOD SUGAR DIAGNOSTIC
STRIP MISCELLANEOUS
Qty: 400 EACH | Refills: 1 | Status: SHIPPED | OUTPATIENT
Start: 2022-01-05

## 2022-01-24 RX ORDER — ATENOLOL 50 MG/1
50 TABLET ORAL DAILY
Qty: 90 TABLET | Refills: 1 | Status: SHIPPED | OUTPATIENT
Start: 2022-01-24 | End: 2022-07-25

## 2022-02-15 ENCOUNTER — HOSPITAL ENCOUNTER (OUTPATIENT)
Dept: MAMMOGRAPHY | Facility: HOSPITAL | Age: 72
Discharge: HOME | End: 2022-02-15
Admitting: NURSE PRACTITIONER

## 2022-02-15 DIAGNOSIS — Z12.31 VISIT FOR SCREENING MAMMOGRAM: ICD-10-CM

## 2022-02-15 PROCEDURE — 77067 SCR MAMMO BI INCL CAD: CPT

## 2022-02-15 PROCEDURE — 77063 BREAST TOMOSYNTHESIS BI: CPT

## 2022-03-10 RX ORDER — NIFEDIPINE 60 MG/1
TABLET, FILM COATED, EXTENDED RELEASE ORAL
Qty: 90 TABLET | Refills: 0 | Status: SHIPPED | OUTPATIENT
Start: 2022-03-10 | End: 2022-05-31

## 2022-04-18 RX ORDER — SIMVASTATIN 40 MG
TABLET ORAL
Qty: 90 TABLET | Refills: 1 | Status: SHIPPED | OUTPATIENT
Start: 2022-04-18 | End: 2022-05-03 | Stop reason: SDUPTHER

## 2022-04-18 RX ORDER — FUROSEMIDE 40 MG/1
TABLET ORAL
Qty: 180 TABLET | Refills: 0 | Status: SHIPPED | OUTPATIENT
Start: 2022-04-18 | End: 2022-07-12

## 2022-05-03 ENCOUNTER — OFFICE VISIT (OUTPATIENT)
Dept: CARDIOLOGY | Facility: CLINIC | Age: 72
End: 2022-05-03

## 2022-05-03 VITALS
HEART RATE: 71 BPM | DIASTOLIC BLOOD PRESSURE: 60 MMHG | HEIGHT: 64 IN | WEIGHT: 184 LBS | BODY MASS INDEX: 31.41 KG/M2 | SYSTOLIC BLOOD PRESSURE: 124 MMHG

## 2022-05-03 DIAGNOSIS — I10 ESSENTIAL HYPERTENSION: ICD-10-CM

## 2022-05-03 DIAGNOSIS — I50.32 CHRONIC DIASTOLIC (CONGESTIVE) HEART FAILURE: Primary | ICD-10-CM

## 2022-05-03 DIAGNOSIS — E78.2 MIXED HYPERLIPIDEMIA: ICD-10-CM

## 2022-05-03 PROBLEM — K63.5 COLON POLYPS: Status: ACTIVE | Noted: 2019-10-14

## 2022-05-03 PROCEDURE — 99214 OFFICE O/P EST MOD 30 MIN: CPT | Performed by: NURSE PRACTITIONER

## 2022-05-03 RX ORDER — SIMVASTATIN 40 MG
40 TABLET ORAL DAILY
Qty: 90 TABLET | Refills: 1 | Status: SHIPPED | OUTPATIENT
Start: 2022-05-03 | End: 2023-01-26

## 2022-05-03 NOTE — PROGRESS NOTES
Chief Complaint  Hypertension, Hyperlipidemia, and Congestive Heart Failure    Subjective            History of Present Illness  Jeimy Estevez is a 72-year-old white/ female patient who presents to the office today for follow-up.  She has chronic diastolic CHF, hypertension, and hyperlipidemia.  She reports compliance with all of her medications.  She does admit to some mild shortness of breath with exertion and bilateral lower extremity edema.  She denies any chest pain, lightheadedness/dizziness, or palpitations.    PMH  Past Medical History:   Diagnosis Date   • CHF (congestive heart failure) (HCC)    • Chronic diastolic (congestive) heart failure 11/1/2021   • Diabetes mellitus (HCC)    • Hyperlipidemia    • Hypertension    • Poor circulation     seems to have resolved         ALLERGY  No Known Allergies       SURGICALHX  Past Surgical History:   Procedure Laterality Date   • APPENDECTOMY     • CARDIAC CATHETERIZATION     • CHOLECYSTECTOMY     • TONSILLECTOMY     • TUBAL ABDOMINAL LIGATION            SOC  Social History     Socioeconomic History   • Marital status:    Tobacco Use   • Smoking status: Never Smoker   • Smokeless tobacco: Never Used   Vaping Use   • Vaping Use: Never used   Substance and Sexual Activity   • Alcohol use: Never   • Drug use: Never   • Sexual activity: Defer         FAMHX  Family History   Problem Relation Age of Onset   • Heart attack Mother    • No Known Problems Father    • Diabetes Paternal Grandmother           MEDSIGONLY  Current Outpatient Medications on File Prior to Visit   Medication Sig   • Accu-Chek SmartView test strip USE AS DIRECTED TO TEST FOUR TIMES DAILY AS NEEDED   • atenolol (TENORMIN) 50 MG tablet TAKE 1 TABLET BY MOUTH DAILY   • BD Pen Needle Kathy 2nd Gen 32G X 4 MM misc USE ONCE DAILY WITH INJECTIONS   • Biotin 5 MG tablet dispersible biotin 5,000 mcg oral tablet,disintegrating dissolve  1 tablet by oral route daily   Active   • furosemide  "(LASIX) 40 MG tablet TAKE 1 TABLET(40 MG) BY MOUTH TWICE DAILY   • glimepiride (AMARYL) 4 MG tablet Take 1 tablet by mouth 2 (Two) Times a Day.   • ibuprofen (ADVIL,MOTRIN) 600 MG tablet ibuprofen 600 mg oral tablet take 1 tablet (600 mg) by oral route every 8 hours as needed with food   Active   • metFORMIN (GLUCOPHAGE) 500 MG tablet Take 1,000 mg by mouth 2 (Two) Times a Day.   • NIFEdipine CC (ADALAT CC) 60 MG 24 hr tablet TAKE 1 TABLET BY MOUTH EVERY DAY   • Semaglutide,0.25 or 0.5MG/DOS, (Ozempic, 0.25 or 0.5 MG/DOSE,) 2 MG/1.5ML solution pen-injector Inject 0.25 mg under the skin into the appropriate area as directed 1 (One) Time Per Week.   • simvastatin (ZOCOR) 40 MG tablet TAKE 1 TABLET BY MOUTH EVERY DAY     No current facility-administered medications on file prior to visit.         Objective   /60   Pulse 71   Ht 162.6 cm (64\")   Wt 83.5 kg (184 lb)   BMI 31.58 kg/m²       Physical Exam  Constitutional:       Appearance: She is obese.   HENT:      Head: Normocephalic.   Neck:      Vascular: No carotid bruit.   Cardiovascular:      Rate and Rhythm: Normal rate and regular rhythm.      Pulses: Normal pulses.      Heart sounds: Normal heart sounds. No murmur heard.  Pulmonary:      Effort: Pulmonary effort is normal.      Breath sounds: Normal breath sounds.   Musculoskeletal:      Cervical back: Neck supple.      Right lower le+ Pitting Edema present.      Left lower le+ Pitting Edema present.   Skin:     General: Skin is dry.      Capillary Refill: Capillary refill takes less than 2 seconds.   Neurological:      Mental Status: She is alert and oriented to person, place, and time.   Psychiatric:         Behavior: Behavior normal.       Result Review :   The following data was reviewed by: EBONIE Grady on 2022:  No results found for: PROBNP  CMP    CMP 21   Glucose 136 (A)   BUN 13   Creatinine 0.67   eGFR Non African Am 87   Sodium 138   Potassium 4.0   Chloride 100 "   Calcium 9.5   Albumin 4.50   Total Bilirubin 0.4   Alkaline Phosphatase 74   AST (SGOT) 30   ALT (SGPT) 34 (A)   (A) Abnormal value            CBC w/diff    CBC w/Diff 11/29/21   WBC 6.73   RBC 4.63   Hemoglobin 14.1   Hematocrit 39.4   MCV 85.1   MCH 30.5   MCHC 35.8 (A)   RDW 12.4   Platelets 222   Neutrophil Rel % 39.5 (A)   Immature Granulocyte Rel % 0.1   Lymphocyte Rel % 48.7 (A)   Monocyte Rel % 10.4   Eosinophil Rel % 1.2   Basophil Rel % 0.1   (A) Abnormal value             Lab Results   Component Value Date    TSH 2.440 11/29/2021      Lab Results   Component Value Date    FREET4 1.2 07/21/2020      No results found for: DDIMERQUANT  No results found for: MG   No results found for: DIGOXIN   No results found for: TROPONINT        Lipid Panel    Lipid Panel 11/29/21   Total Cholesterol 137   Triglycerides 121   HDL Cholesterol 60   VLDL Cholesterol 21   LDL Cholesterol  56   LDL/HDL Ratio 0.88   (A) Abnormal value         10/22/2020 echo:  CONCLUSION:  1.  Normal ejection fraction of 55%.   2.  Mild left ventricular hypertrophy.   3.  Mild left atrial enlargement.   4.  Trace tricuspid regurgitation.     Assessment and Plan    Diagnoses and all orders for this visit:    1. Chronic diastolic (congestive) heart failure (Primary)  She is having some mild shortness of breath with exertion and bilateral lower extremity edema.  Advised daily weight, fluid restriction, low-sodium diet, and compression socks.  She will take an extra dose of her Lasix for weight gain of 5 pounds or more.  Check proBNP to assess CHF.  Continue atenolol 50 mg daily and Lasix 40 mg twice daily.  -     proBNP; Future    2. Essential hypertension  Currently controlled and without adverse effects from medication, continue atenolol 50 mg daily and nifedipine 60 mg daily.  -     Basic Metabolic Panel; Future    3. Mixed hyperlipidemia  Last lipid panel was 11/29/2021 with LDL of 56 which is within her goal range, continue simvastatin 40  mg daily.  Recheck lipid and hepatic function panel in 6 months.  -     Lipid Panel; Future  -     Hepatic Function Panel; Future    Other orders  -     simvastatin (ZOCOR) 40 MG tablet; Take 1 tablet by mouth Daily.  Dispense: 90 tablet; Refill: 1            Follow Up   Return in about 6 months (around 11/3/2022) for Follow up with Dr Triplett.    Patient was given instructions and counseling regarding her condition or for health maintenance advice. Please see specific information pulled into the AVS if appropriate.     Jeimy Estevez  reports that she has never smoked. She has never used smokeless tobacco.           Coretta Gonzalez, EBONIE  05/03/22  10:13 EDT    Dictated Utilizing Dragon Dictation

## 2022-05-31 ENCOUNTER — OFFICE VISIT (OUTPATIENT)
Dept: INTERNAL MEDICINE | Facility: CLINIC | Age: 72
End: 2022-05-31

## 2022-05-31 VITALS
SYSTOLIC BLOOD PRESSURE: 110 MMHG | HEART RATE: 74 BPM | DIASTOLIC BLOOD PRESSURE: 68 MMHG | TEMPERATURE: 98 F | BODY MASS INDEX: 31.69 KG/M2 | HEIGHT: 64 IN | OXYGEN SATURATION: 96 % | WEIGHT: 185.6 LBS

## 2022-05-31 DIAGNOSIS — I10 ESSENTIAL HYPERTENSION: ICD-10-CM

## 2022-05-31 DIAGNOSIS — R15.9 INCONTINENCE OF FECES, UNSPECIFIED FECAL INCONTINENCE TYPE: ICD-10-CM

## 2022-05-31 DIAGNOSIS — I50.32 CHRONIC DIASTOLIC (CONGESTIVE) HEART FAILURE: ICD-10-CM

## 2022-05-31 DIAGNOSIS — E11.65 TYPE 2 DIABETES MELLITUS WITH HYPERGLYCEMIA, WITHOUT LONG-TERM CURRENT USE OF INSULIN: Primary | ICD-10-CM

## 2022-05-31 DIAGNOSIS — E78.5 HYPERLIPIDEMIA, UNSPECIFIED HYPERLIPIDEMIA TYPE: ICD-10-CM

## 2022-05-31 PROCEDURE — 99214 OFFICE O/P EST MOD 30 MIN: CPT | Performed by: NURSE PRACTITIONER

## 2022-05-31 RX ORDER — NIFEDIPINE 60 MG/1
TABLET, FILM COATED, EXTENDED RELEASE ORAL
Qty: 90 TABLET | Refills: 1 | Status: SHIPPED | OUTPATIENT
Start: 2022-05-31 | End: 2023-01-26

## 2022-05-31 NOTE — ASSESSMENT & PLAN NOTE
Continue metformin, glimepiride and Ozempic. Follow up with [place] as scheduled next week for repeat labs. Most recent HgbA1c 7.81, 6.8 per patient report. Encouraged patient to continue to monitor blood glucose levels and to call with consistent elevations. Diabetic eye exam: 1/2022. Diabetic foot exam: Today. Urine microalbumin: 11/2021. Renal protection: Declines. Pneumonia vaccination: Up to date.

## 2022-05-31 NOTE — ASSESSMENT & PLAN NOTE
Continue Lasix, statin, BB. Follow up with cardiology as scheduled. Patient to consider discussing repeat echo with cardiology at next follow up appointment. Will continue to monitor.    no distention/bowel sounds normal/soft/nontender

## 2022-05-31 NOTE — ASSESSMENT & PLAN NOTE
Discussed with patient that this may be secondary to aging, however would recommend evaluation for underlying cause. She would prefer to postpone GI referral at this time but will monitor for triggers in diet, medications or stress level. She will call or return to clinic if symptoms worsen or persist. Low threshold for referral in this patient. Will follow up in three months versus six for close monitoring, she will call sooner with concerns.

## 2022-05-31 NOTE — ASSESSMENT & PLAN NOTE
Well controlled, continue atenolol and nifedipine. Encouraged patient to continue to monitor blood pressure at home and to call or return to clinic with consistent elevations greater than 130/80. Patient requested to postpone labs until next follow up appointment.

## 2022-05-31 NOTE — ASSESSMENT & PLAN NOTE
Well controlled, continue statin. Most recent LDL 56. Lipid panel with labs at follow up visit in three months.

## 2022-05-31 NOTE — PROGRESS NOTES
Chief Complaint  Follow-up (6 month/), Hyperlipidemia, Diabetes, Hypertension, and Congestive Heart Failure    Subjective        Jeimy Estevez presents to [facility]  HTN-  Managed with atenolol and nifedipine. She does not check her blood pressure at home. Denies chest pain, blurry vision, headache. States her legs have started to swell with the summer heat, this is not uncommon for her.     HLD-  Managed with statin, well tolerated. Denies leg cramping. Most recent LDL 56.    CHF-  Managed with Lasix, statin, BB. Denies shortness of breath, weight gain, orthopnea. Recent follow up with cardiology, 5/2022. Medications were not adjusted at that time. Last echo 2014. Scheduled for follow up with Dr. Triplett in six months.     DM2-  Managed by [facility], scheduled for follow up next week. Patient would like to postpone labs in clinic today because she will have these drawn for upcoming visit. Currently managed with metformin, glimepiride and Ozempic. Most recent HgbA1c 7.81, reports 6.8 at [facility]. Reports fasting blood glucose readings 120s. Denies lows.  Diabetic eye exam: 1/2022. Diabetic foot exam: 2021, due. Urine microalbumin: 11/2021. Renal protection: Declines. Pneumonia vaccination: Up to date.    Patient admits to three episodes of fecal incontinence over the past six months. Two were very small amounts and one was a larger amount. Patient states that she had no idea that this was about to happen, she had no abdominal pain or urge to go. She does not feel like this was diet related and cannot identify any known triggers. Denies hematochezia, rectal or low back pain. Up to date on colonoscopy, due to repeat in three years. Patient admits to otherwise normal bowel movements.     Mammogram: 2/2022  DEXA: 10/2020, will repeat 2/2023 with mammogram  Colonoscopy: 5/2020, repeat 2025  COVID19 vaccination: Declines  Shingles vaccination: Declines      Objective  "  Vital Signs:  /68 (BP Location: Left arm, Patient Position: Sitting, Cuff Size: Adult)   Pulse 74   Temp 98 °F (36.7 °C) (Oral)   Ht 162.6 cm (64\")   Wt 84.2 kg (185 lb 9.6 oz)   SpO2 96%   BMI 31.86 kg/m²           Physical Exam  Constitutional:       Appearance: Normal appearance.   HENT:      Head: Normocephalic and atraumatic.      Nose: Nose normal.      Mouth/Throat:      Mouth: Mucous membranes are moist.      Pharynx: Oropharynx is clear.   Eyes:      Extraocular Movements: Extraocular movements intact.      Conjunctiva/sclera: Conjunctivae normal.      Pupils: Pupils are equal, round, and reactive to light.   Neck:      Thyroid: No thyroid mass, thyromegaly or thyroid tenderness.   Cardiovascular:      Rate and Rhythm: Normal rate and regular rhythm.      Heart sounds: Normal heart sounds.   Pulmonary:      Effort: Pulmonary effort is normal.      Breath sounds: Normal breath sounds.   Musculoskeletal:        Feet:    Skin:     General: Skin is warm and dry.   Neurological:      General: No focal deficit present.      Mental Status: She is alert and oriented to person, place, and time.   Psychiatric:         Mood and Affect: Mood normal.         Behavior: Behavior normal.         Thought Content: Thought content normal.        Result Review :                Assessment and Plan   Diagnoses and all orders for this visit:    1. Type 2 diabetes mellitus with hyperglycemia, without long-term current use of insulin (HCC) (Primary)  Assessment & Plan:  Continue metformin, glimepiride and Ozempic. Follow up with [facility] as scheduled next week for repeat labs. Most recent HgbA1c 7.81, 6.8 per patient report. Encouraged patient to continue to monitor blood glucose levels and to call with consistent elevations. Diabetic eye exam: 1/2022. Diabetic foot exam: Today. Urine microalbumin: 11/2021. Renal protection: Declines. Pneumonia vaccination: Up to date.        2. Chronic diastolic (congestive) heart " failure (HCC)  Assessment & Plan:  Continue Lasix, statin, BB. Follow up with cardiology as scheduled. Patient to consider discussing repeat echo with cardiology at next follow up appointment. Will continue to monitor.       3. Hyperlipidemia, unspecified hyperlipidemia type  Assessment & Plan:  Well controlled, continue statin. Most recent LDL 56. Lipid panel with labs at follow up visit in three months.        4. Essential hypertension  Assessment & Plan:  Well controlled, continue atenolol and nifedipine. Encouraged patient to continue to monitor blood pressure at home and to call or return to clinic with consistent elevations greater than 130/80. Patient requested to postpone labs until next follow up appointment.         5. Incontinence of feces, unspecified fecal incontinence type  Assessment & Plan:  Discussed with patient that this may be secondary to aging, however would recommend evaluation for underlying cause. She would prefer to postpone GI referral at this time but will monitor for triggers in diet, medications or stress level. She will call or return to clinic if symptoms worsen or persist. Low threshold for referral in this patient. Will follow up in three months versus six for close monitoring, she will call sooner with concerns.              Follow Up   Return in about 3 months (around 8/31/2022).  Patient was given instructions and counseling regarding her condition or for health maintenance advice. Please see specific information pulled into the AVS if appropriate.

## 2022-07-12 RX ORDER — FUROSEMIDE 40 MG/1
TABLET ORAL
Qty: 180 TABLET | Refills: 0 | Status: SHIPPED | OUTPATIENT
Start: 2022-07-12 | End: 2022-10-11

## 2022-07-25 RX ORDER — ATENOLOL 50 MG/1
50 TABLET ORAL DAILY
Qty: 90 TABLET | Refills: 1 | Status: SHIPPED | OUTPATIENT
Start: 2022-07-25 | End: 2023-01-26

## 2022-10-11 RX ORDER — FUROSEMIDE 40 MG/1
TABLET ORAL
Qty: 180 TABLET | Refills: 0 | Status: SHIPPED | OUTPATIENT
Start: 2022-10-11 | End: 2023-01-20

## 2022-11-30 ENCOUNTER — OFFICE VISIT (OUTPATIENT)
Dept: INTERNAL MEDICINE | Facility: CLINIC | Age: 72
End: 2022-11-30

## 2022-11-30 VITALS
WEIGHT: 182.6 LBS | OXYGEN SATURATION: 97 % | SYSTOLIC BLOOD PRESSURE: 114 MMHG | BODY MASS INDEX: 31.18 KG/M2 | DIASTOLIC BLOOD PRESSURE: 64 MMHG | HEIGHT: 64 IN | TEMPERATURE: 97.7 F | HEART RATE: 73 BPM

## 2022-11-30 DIAGNOSIS — Z78.0 POSTMENOPAUSAL: ICD-10-CM

## 2022-11-30 DIAGNOSIS — I10 ESSENTIAL HYPERTENSION: ICD-10-CM

## 2022-11-30 DIAGNOSIS — Z00.00 MEDICARE ANNUAL WELLNESS VISIT, SUBSEQUENT: Primary | ICD-10-CM

## 2022-11-30 DIAGNOSIS — E78.5 HYPERLIPIDEMIA, UNSPECIFIED HYPERLIPIDEMIA TYPE: ICD-10-CM

## 2022-11-30 DIAGNOSIS — Z12.31 VISIT FOR SCREENING MAMMOGRAM: ICD-10-CM

## 2022-11-30 DIAGNOSIS — I50.32 CHRONIC DIASTOLIC (CONGESTIVE) HEART FAILURE: ICD-10-CM

## 2022-11-30 DIAGNOSIS — E11.65 TYPE 2 DIABETES MELLITUS WITH HYPERGLYCEMIA, WITHOUT LONG-TERM CURRENT USE OF INSULIN: ICD-10-CM

## 2022-11-30 PROCEDURE — 1170F FXNL STATUS ASSESSED: CPT | Performed by: NURSE PRACTITIONER

## 2022-11-30 PROCEDURE — 1159F MED LIST DOCD IN RCRD: CPT | Performed by: NURSE PRACTITIONER

## 2022-11-30 PROCEDURE — G0439 PPPS, SUBSEQ VISIT: HCPCS | Performed by: NURSE PRACTITIONER

## 2022-11-30 PROCEDURE — 96160 PT-FOCUSED HLTH RISK ASSMT: CPT | Performed by: NURSE PRACTITIONER

## 2022-11-30 RX ORDER — LANCETS
EACH MISCELLANEOUS
COMMUNITY
Start: 2022-09-06

## 2022-11-30 NOTE — ASSESSMENT & PLAN NOTE
Discussed age appropriate immunizations, screenings, nutrition and exercise. Age appropriate handout provided.

## 2022-11-30 NOTE — PROGRESS NOTES
The ABCs of the Annual Wellness Visit  Subsequent Medicare Wellness Visit    Chief Complaint   Patient presents with   • Medicare Wellness-subsequent      Subjective    History of Present Illness:  Jeiym Estevez is a 72 y.o. female who presents for a Subsequent Medicare Wellness Visit.    The following portions of the patient's history were reviewed and   updated as appropriate: allergies, current medications, past family history, past medical history, past social history, past surgical history and problem list.    Compared to one year ago, the patient feels her physical   health is better    Compared to one year ago, the patient feels her mental   health is same    HTN-  Managed with atenolol and nifedipine. She does not check her blood pressure at home. Denies chest pain, headache, leg swelling. Blurry vision has worsened some, patient was told a few years ago that she had cataracts. She is scheduled in January for her routine eye exam.     HLD-  Managed with statin, well tolerated. Denies leg cramping. Most recent LDL 69.     CHF-  Managed with Lasix (decreased from 2 to 1 tablets this summer without issue), statin, BB. Denies shortness of breath, weight gain, orthopnea. Scheduled for follow up with Dr. Triplett 2/2023. Patient thinks she is due for an echo at follow up.     DM2-  Managed by [facility], most recent follow up November 10th. She was taken off of glimepiride at that time as her A1c was 6.1. Currently managed with metformin, Ozempic. Reports fasting blood glucose readings 140s, has noticed an increase since stopping glimepiride. Denies lows. Diabetic eye exam: 1/2022. Diabetic foot exam: 6/2022. Urine microalbumin: 11/2022. Renal protection: Declines. Pneumonia vaccination: Declines.     Mammogram: 2/2022  DEXA: 10/2020, ordered for repeat 2/2023 with mammogram  Colonoscopy: 5/2020, repeat 2025  COVID19 vaccination: Declines  Shingles vaccination: Declines  Influenza vaccination:  Declines       Recent Hospitalizations:  She was not admitted to the hospital during the last year.       Current Medical Providers:  Patient Care Team:  Minoo Shirley APRN as PCP - General (Nurse Practitioner)    Outpatient Medications Prior to Visit   Medication Sig Dispense Refill   • Accu-Chek SmartView test strip USE AS DIRECTED TO TEST FOUR TIMES DAILY AS NEEDED 400 each 1   • atenolol (TENORMIN) 50 MG tablet TAKE 1 TABLET BY MOUTH DAILY 90 tablet 1   • BD Pen Needle Kathy 2nd Gen 32G X 4 MM misc USE ONCE DAILY WITH INJECTIONS     • Biotin 5 MG tablet dispersible biotin 5,000 mcg oral tablet,disintegrating dissolve  1 tablet by oral route daily   Active     • furosemide (LASIX) 40 MG tablet TAKE 1 TABLET(40 MG) BY MOUTH TWICE DAILY 180 tablet 0   • metFORMIN (GLUCOPHAGE) 500 MG tablet Take 1,000 mg by mouth 2 (Two) Times a Day.     • NIFEdipine CC (ADALAT CC) 60 MG 24 hr tablet TAKE 1 TABLET BY MOUTH EVERY DAY 90 tablet 1   • Semaglutide,0.25 or 0.5MG/DOS, (Ozempic, 0.25 or 0.5 MG/DOSE,) 2 MG/1.5ML solution pen-injector Inject 0.25 mg under the skin into the appropriate area as directed 1 (One) Time Per Week.     • simvastatin (ZOCOR) 40 MG tablet Take 1 tablet by mouth Daily. 90 tablet 1   • Accu-Chek FastClix Lancets misc USE TO CHECK BLOOD SUGAR ONCE A DAY     • glimepiride (AMARYL) 4 MG tablet Take 1 tablet by mouth 2 (Two) Times a Day.     • ibuprofen (ADVIL,MOTRIN) 600 MG tablet ibuprofen 600 mg oral tablet take 1 tablet (600 mg) by oral route every 8 hours as needed with food   Active       No facility-administered medications prior to visit.       No opioid medication identified on active medication list. I have reviewed chart for other potential  high risk medication/s and harmful drug interactions in the elderly.          Aspirin is not on active medication list.  Aspirin use is not indicated based on review of current medical condition/s. Risk of harm outweighs potential benefits.  .    Patient  "Active Problem List   Diagnosis   • Chronic diastolic (congestive) heart failure   • Essential hypertension   • Type 2 diabetes mellitus with hyperglycemia, without long-term current use of insulin (HCC)   • Medicare annual wellness visit, subsequent   • Hyperlipidemia   • Colon polyps   • Incontinence of feces     Advance Care Planning  Advance Directive is not on file.  ACP discussion was held with the patient during this visit. Patient has an advance directive (not in EMR), copy requested.          Objective    Vitals:    11/30/22 1029   BP: 114/64   BP Location: Right arm   Patient Position: Sitting   Cuff Size: Adult   Pulse: 73   Temp: 97.7 °F (36.5 °C)   TempSrc: Temporal   SpO2: 97%   Weight: 82.8 kg (182 lb 9.6 oz)   Height: 162.6 cm (64\")     Estimated body mass index is 31.34 kg/m² as calculated from the following:    Height as of this encounter: 162.6 cm (64\").    Weight as of this encounter: 82.8 kg (182 lb 9.6 oz).          Does the patient have evidence of cognitive impairment? No    Physical Exam  Lab Results   Component Value Date    CHLPL 166 11/10/2022    TRIG 149 11/10/2022    HDL 67 11/10/2022    LDL 69 11/10/2022            HEALTH RISK ASSESSMENT    Smoking Status:  Social History     Tobacco Use   Smoking Status Never   Smokeless Tobacco Never     Alcohol Consumption:  Social History     Substance and Sexual Activity   Alcohol Use Never     Fall Risk Screen:    REJI Fall Risk Assessment was completed, and patient is at LOW risk for falls.Assessment completed on:11/30/2022    Depression Screening:  PHQ-2/PHQ-9 Depression Screening 5/31/2022   Retired PHQ-9 Total Score -   Retired Total Score -   Little Interest or Pleasure in Doing Things 0-->not at all   Feeling Down, Depressed or Hopeless 0-->not at all   PHQ-9: Brief Depression Severity Measure Score 0       Health Habits and Functional and Cognitive Screening:  Functional & Cognitive Status 11/30/2022   Do you have difficulty preparing " food and eating? -   Do you have difficulty bathing yourself, getting dressed or grooming yourself? -   Do you have difficulty using the toilet? -   Do you have difficulty moving around from place to place? -   Do you have trouble with steps or getting out of a bed or a chair? -   Current Diet -   Dental Exam -   Eye Exam -   Exercise (times per week) -   Current Exercises Include -   Do you need help using the phone?  -   Are you deaf or do you have serious difficulty hearing?  -   Do you need help with transportation? -   Do you need help shopping? -   Do you need help preparing meals?  -   Do you need help with housework?  -   Do you need help with laundry? -   Do you need help taking your medications? -   Do you need help managing money? -   Do you ever drive or ride in a car without wearing a seat belt? No   Have you felt unusual stress, anger or loneliness in the last month? -   Who do you live with? -   If you need help, do you have trouble finding someone available to you? -   Have you been bothered in the last four weeks by sexual problems? -   Do you have difficulty concentrating, remembering or making decisions? -       Age-appropriate Screening Schedule:  Refer to the list below for future screening recommendations based on patient's age, sex and/or medical conditions. Orders for these recommended tests are listed in the plan section. The patient has been provided with a written plan.    Health Maintenance   Topic Date Due   • TDAP/TD VACCINES (1 - Tdap) Never done   • DXA SCAN  10/21/2022   • ZOSTER VACCINE (1 of 2) 11/30/2022 (Originally 3/4/2001)   • INFLUENZA VACCINE  03/31/2023 (Originally 8/1/2022)   • DIABETIC EYE EXAM  02/08/2023   • HEMOGLOBIN A1C  05/10/2023   • DIABETIC FOOT EXAM  06/01/2023   • LIPID PANEL  11/10/2023   • URINE MICROALBUMIN  11/10/2023   • MAMMOGRAM  02/15/2024              Assessment & Plan   CMS Preventative Services Quick Reference  Risk Factors Identified During  Encounter  Immunizations Discussed/Encouraged (specific Immunizations; Influenza, Shingrix and COVID19  The above risks/problems have been discussed with the patient.  Follow up actions/plans if indicated are seen below in the Assessment/Plan Section.  Pertinent information has been shared with the patient in the After Visit Summary.    Diagnoses and all orders for this visit:    1. Medicare annual wellness visit, subsequent (Primary)  Assessment & Plan:  Discussed age appropriate immunizations, screenings, nutrition and exercise. Age appropriate handout provided.        2. Type 2 diabetes mellitus with hyperglycemia, without long-term current use of insulin (HCC)  Assessment & Plan:  Well controlled, continue metformin and Ozempic. Most recent HgbA1c 6.1. Encouraged patient to continue to monitor blood glucose levels and to call with consistent elevations. Scheduled for follow up with endocrinology in six months, discussed with patient that repeat A1c could be be ordered in clinic in three months if blood glucose levels continue to increase with stopping of glimepiride. Diabetic eye exam: 1/2022. Diabetic foot exam: 6/2022. Urine microalbumin: 11/2022. Renal protection: Declines. Pneumonia vaccination: Declines.      3. Essential hypertension  Assessment & Plan:  Well controlled, continue atenolol and nifedipine. Patient should monitor blood pressure at home and to call or return to clinic with consistent elevations greater than 130/80. Labs deferred, she will continue to get through endocrinology.         4. Hyperlipidemia, unspecified hyperlipidemia type  Assessment & Plan:  Well controlled, continue statin. Most recent LDL 69. Lipid panel with labs.        5. Chronic diastolic (congestive) heart failure (HCC)  Assessment & Plan:  Continue lasix, statin, BB, CCB and follow up with cardiology as scheduled. Will continue to monitor.       6. Postmenopausal  Comments:  DEXA ordered.  Orders:  -     Cancel: DEXA Bone  Density Axial; Future  -     DEXA Bone Density Axial; Future    7. Visit for screening mammogram  Comments:  Mammogram ordered.  Orders:  -     Cancel: Mammo Screening Digital Tomosynthesis Bilateral With CAD; Future  -     Mammo Screening Digital Tomosynthesis Bilateral With CAD; Future      Follow Up:   Return in about 6 months (around 5/30/2023).     An After Visit Summary and PPPS were made available to the patient.

## 2022-11-30 NOTE — ASSESSMENT & PLAN NOTE
Well controlled, continue metformin and Ozempic. Most recent HgbA1c 6.1. Encouraged patient to continue to monitor blood glucose levels and to call with consistent elevations. Scheduled for follow up with endocrinology in six months, discussed with patient that repeat A1c could be be ordered in clinic in three months if blood glucose levels continue to increase with stopping of glimepiride. Diabetic eye exam: 1/2022. Diabetic foot exam: 6/2022. Urine microalbumin: 11/2022. Renal protection: Declines. Pneumonia vaccination: Declines.

## 2022-11-30 NOTE — ASSESSMENT & PLAN NOTE
Well controlled, continue atenolol and nifedipine. Patient should monitor blood pressure at home and to call or return to clinic with consistent elevations greater than 130/80. Labs deferred, she will continue to get through endocrinology.

## 2022-11-30 NOTE — ASSESSMENT & PLAN NOTE
Continue lasix, statin, BB, CCB and follow up with cardiology as scheduled. Will continue to monitor.

## 2023-01-20 RX ORDER — FUROSEMIDE 40 MG/1
TABLET ORAL
Qty: 180 TABLET | Refills: 1 | Status: SHIPPED | OUTPATIENT
Start: 2023-01-20 | End: 2023-02-20

## 2023-01-26 RX ORDER — ATENOLOL 50 MG/1
50 TABLET ORAL DAILY
Qty: 90 TABLET | Refills: 1 | Status: SHIPPED | OUTPATIENT
Start: 2023-01-26 | End: 2023-03-10 | Stop reason: SDUPTHER

## 2023-01-26 RX ORDER — SIMVASTATIN 40 MG
40 TABLET ORAL DAILY
Qty: 90 TABLET | Refills: 1 | Status: SHIPPED | OUTPATIENT
Start: 2023-01-26

## 2023-01-26 RX ORDER — SIMVASTATIN 40 MG
40 TABLET ORAL DAILY
Qty: 90 TABLET | Refills: 1 | OUTPATIENT
Start: 2023-01-26

## 2023-01-26 RX ORDER — NIFEDIPINE 60 MG/1
TABLET, FILM COATED, EXTENDED RELEASE ORAL
Qty: 90 TABLET | Refills: 1 | Status: SHIPPED | OUTPATIENT
Start: 2023-01-26 | End: 2023-03-20 | Stop reason: SDUPTHER

## 2023-02-20 ENCOUNTER — OFFICE VISIT (OUTPATIENT)
Dept: CARDIOLOGY | Facility: CLINIC | Age: 73
End: 2023-02-20
Payer: MEDICARE

## 2023-02-20 VITALS
DIASTOLIC BLOOD PRESSURE: 69 MMHG | HEART RATE: 68 BPM | BODY MASS INDEX: 31.24 KG/M2 | WEIGHT: 183 LBS | HEIGHT: 64 IN | SYSTOLIC BLOOD PRESSURE: 129 MMHG

## 2023-02-20 DIAGNOSIS — I10 ESSENTIAL HYPERTENSION: ICD-10-CM

## 2023-02-20 DIAGNOSIS — I50.32 CHRONIC DIASTOLIC (CONGESTIVE) HEART FAILURE: Primary | ICD-10-CM

## 2023-02-20 PROCEDURE — 99214 OFFICE O/P EST MOD 30 MIN: CPT | Performed by: INTERNAL MEDICINE

## 2023-02-20 RX ORDER — FUROSEMIDE 40 MG/1
40 TABLET ORAL DAILY
Qty: 180 TABLET | Refills: 1
Start: 2023-02-20

## 2023-02-20 NOTE — PROGRESS NOTES
Chief Complaint  Follow-up, Congestive Heart Failure, Hypertension, and Hyperlipidemia    Subjective    Patient doing well symptomatically stable weight as well as breathing capacity.  Denies any chest pain    Past Medical History:   Diagnosis Date   • CHF (congestive heart failure) (HCC)    • Chronic diastolic (congestive) heart failure 11/1/2021   • Diabetes mellitus (HCC)    • Hyperlipidemia    • Hypertension    • Poor circulation     seems to have resolved         Current Outpatient Medications:   •  Accu-Chek FastClix Lancets misc, USE TO CHECK BLOOD SUGAR ONCE A DAY, Disp: , Rfl:   •  Accu-Chek SmartView test strip, USE AS DIRECTED TO TEST FOUR TIMES DAILY AS NEEDED, Disp: 400 each, Rfl: 1  •  atenolol (TENORMIN) 50 MG tablet, TAKE 1 TABLET BY MOUTH DAILY, Disp: 90 tablet, Rfl: 1  •  BD Pen Needle Kathy 2nd Gen 32G X 4 MM misc, USE ONCE DAILY WITH INJECTIONS, Disp: , Rfl:   •  Biotin 5 MG tablet dispersible, biotin 5,000 mcg oral tablet,disintegrating dissolve  1 tablet by oral route daily   Active, Disp: , Rfl:   •  furosemide (LASIX) 40 MG tablet, Take 1 tablet by mouth Daily., Disp: 180 tablet, Rfl: 1  •  metFORMIN (GLUCOPHAGE) 500 MG tablet, Take 1,000 mg by mouth 2 (Two) Times a Day., Disp: , Rfl:   •  NIFEdipine CC (ADALAT CC) 60 MG 24 hr tablet, TAKE 1 TABLET BY MOUTH EVERY DAY, Disp: 90 tablet, Rfl: 1  •  Semaglutide,0.25 or 0.5MG/DOS, (Ozempic, 0.25 or 0.5 MG/DOSE,) 2 MG/1.5ML solution pen-injector, Inject 0.25 mg under the skin into the appropriate area as directed 1 (One) Time Per Week., Disp: , Rfl:   •  simvastatin (ZOCOR) 40 MG tablet, TAKE 1 TABLET BY MOUTH DAILY, Disp: 90 tablet, Rfl: 1    Medications Discontinued During This Encounter   Medication Reason   • furosemide (LASIX) 40 MG tablet      No Known Allergies     Social History     Tobacco Use   • Smoking status: Never   • Smokeless tobacco: Never   Vaping Use   • Vaping Use: Never used   Substance Use Topics   • Alcohol use: Never   • Drug  "use: Never       Family History   Problem Relation Age of Onset   • Heart attack Mother    • No Known Problems Father    • Diabetes Paternal Grandmother         Objective     /69 (BP Location: Left arm, Patient Position: Sitting, Cuff Size: Adult)   Pulse 68   Ht 162.6 cm (64\")   Wt 83 kg (183 lb)   BMI 31.41 kg/m²       Physical Exam    General Appearance:   · no acute distress  · Alert and oriented x3  HENT:   · lips not cyanotic  · Atraumatic  Neck:  · No jvd   · supple  Respiratory:  · no respiratory distress  · normal breath sounds  · no rales  Cardiovascular:  · Regular rate and rhythm  · no S3, no S4   · no murmur  · no rub  Extremities  · No cyanosis  · lower extremity edema: none    Skin:   · warm, dry  · No rashes      Result Review :     No results found for: PROBNP       Lab Results   Component Value Date    TSH 2.440 11/29/2021      Lab Results   Component Value Date    FREET4 1.2 07/21/2020      No results found for: DDIMERQUANT  No results found for: MG   No results found for: DIGOXIN   No results found for: TROPONINT        Lipid Panel    Lipid Panel 11/10/22   Total Cholesterol 166   Triglycerides 149   HDL Cholesterol 67   LDL Cholesterol  69      Comments are available for some flowsheets but are not being displayed.           No results found for: POCTROP                   Diagnoses and all orders for this visit:    1. Chronic diastolic (congestive) heart failure (Primary)  Assessment & Plan:  Continue with Lasix 40 mg once daily with an additional second dose as needed for increased weight gain or symptom change.  Continue to encourage daily weight logs and exercise    Orders:  -     furosemide (LASIX) 40 MG tablet; Take 1 tablet by mouth Daily.  Dispense: 180 tablet; Refill: 1    2. Essential hypertension  Assessment & Plan:  Controlled continue with atenolol 50 mg once a day    Orders:  -     Basic Metabolic Panel; Future          Follow Up     Return in about 6 months (around " 8/20/2023) for Follow with Coretta Gonzalez, EKG with F/U.          Patient was given instructions and counseling regarding her condition or for health maintenance advice. Please see specific information pulled into the AVS if appropriate.

## 2023-02-20 NOTE — ASSESSMENT & PLAN NOTE
Continue with Lasix 40 mg once daily with an additional second dose as needed for increased weight gain or symptom change.  Continue to encourage daily weight logs and exercise

## 2023-03-08 ENCOUNTER — HOSPITAL ENCOUNTER (OUTPATIENT)
Dept: BONE DENSITY | Facility: HOSPITAL | Age: 73
Discharge: HOME | End: 2023-03-08
Payer: MEDICARE

## 2023-03-08 ENCOUNTER — HOSPITAL ENCOUNTER (OUTPATIENT)
Dept: MAMMOGRAPHY | Facility: HOSPITAL | Age: 73
Discharge: HOME | End: 2023-03-08
Payer: MEDICARE

## 2023-03-08 DIAGNOSIS — Z12.31 VISIT FOR SCREENING MAMMOGRAM: ICD-10-CM

## 2023-03-08 DIAGNOSIS — Z78.0 POSTMENOPAUSAL: ICD-10-CM

## 2023-03-08 PROCEDURE — 77080 DXA BONE DENSITY AXIAL: CPT

## 2023-03-08 PROCEDURE — 77063 BREAST TOMOSYNTHESIS BI: CPT

## 2023-03-08 PROCEDURE — 77067 SCR MAMMO BI INCL CAD: CPT

## 2023-03-10 RX ORDER — ATENOLOL 50 MG/1
50 TABLET ORAL DAILY
Qty: 90 TABLET | Refills: 1 | Status: SHIPPED | OUTPATIENT
Start: 2023-03-10

## 2023-03-10 NOTE — TELEPHONE ENCOUNTER
Caller: Jeimy Estevez    Relationship: Self    Best call back number: [phone]    Requested Prescriptions:   Requested Prescriptions     Pending Prescriptions Disp Refills   • atenolol (TENORMIN) 50 MG tablet 90 tablet 1     Sig: Take 1 tablet by mouth Daily.        Pharm[facility] where request should be sent: [id number] - [place], [place] - [address] AT [facility] [address] - [phone]  - [phone] FX     Additional details provided by patient: PATIENT HAS EXACTLY A THREE DAY SUPPLY OF MEDICATION. PLEASE SEND NEW PRESCRIPTION WITH REFILLS TO PHARMACY ASAP.    Does the patient have less than a 3 day supply:  [] Yes  [x] No    Would you like a call back once the refill request has been completed: [] Yes [] No    If the office needs to give you a call back, can they leave a voicemail: [] Yes [] No    Georges Peters Rep   03/10/23 08:59 EST

## 2023-03-20 ENCOUNTER — TELEPHONE (OUTPATIENT)
Dept: INTERNAL MEDICINE | Facility: CLINIC | Age: 73
End: 2023-03-20
Payer: MEDICARE

## 2023-03-20 NOTE — TELEPHONE ENCOUNTER
Caller: Jeimy Estevez    Relationship: Self    Best call back number: [phone]    Requested Prescriptions:   Requested Prescriptions     Pending Prescriptions Disp Refills   • NIFEdipine CC (ADALAT CC) 60 MG 24 hr tablet 90 tablet 1     Sig: Take 1 tablet by mouth Daily.        Pharmacy where request should be sent: [id number] - [place], [place] - [address] AT [facility] [address] - [phone] [facility] [phone] FX       Does the patient have less than a 3 day supply:  [] Yes  [x] No    Would you like a call back once the refill request has been completed: [x] Yes [] No    If the office needs to give you a call back, can they leave a voicemail: [x] Yes [] No    Georges Bell Rep   03/20/23 10:41 EDT

## 2023-03-20 NOTE — TELEPHONE ENCOUNTER
Last office visit: 11/30/22    Next office visit: 5-30-23    Last fill date: 1/26/23     Please Advise

## 2023-03-21 RX ORDER — NIFEDIPINE 60 MG/1
60 TABLET, FILM COATED, EXTENDED RELEASE ORAL DAILY
Qty: 90 TABLET | Refills: 1 | Status: SHIPPED | OUTPATIENT
Start: 2023-03-21

## 2023-05-21 DIAGNOSIS — I50.32 CHRONIC DIASTOLIC (CONGESTIVE) HEART FAILURE: ICD-10-CM

## 2023-05-22 RX ORDER — FUROSEMIDE 40 MG/1
TABLET ORAL
Qty: 180 TABLET | Refills: 1 | Status: SHIPPED | OUTPATIENT
Start: 2023-05-22

## 2023-05-22 RX ORDER — SIMVASTATIN 40 MG
40 TABLET ORAL DAILY
Qty: 90 TABLET | Refills: 1 | Status: SHIPPED | OUTPATIENT
Start: 2023-05-22

## 2023-05-30 ENCOUNTER — TELEPHONE (OUTPATIENT)
Dept: INTERNAL MEDICINE | Facility: CLINIC | Age: 73
End: 2023-05-30

## 2023-05-30 ENCOUNTER — OFFICE VISIT (OUTPATIENT)
Dept: INTERNAL MEDICINE | Facility: CLINIC | Age: 73
End: 2023-05-30

## 2023-05-30 VITALS
WEIGHT: 183 LBS | DIASTOLIC BLOOD PRESSURE: 62 MMHG | TEMPERATURE: 97 F | BODY MASS INDEX: 31.41 KG/M2 | SYSTOLIC BLOOD PRESSURE: 110 MMHG | OXYGEN SATURATION: 97 % | HEART RATE: 69 BPM

## 2023-05-30 DIAGNOSIS — I50.32 CHRONIC DIASTOLIC (CONGESTIVE) HEART FAILURE: ICD-10-CM

## 2023-05-30 DIAGNOSIS — R09.89 GLOBUS SENSATION: ICD-10-CM

## 2023-05-30 DIAGNOSIS — I10 ESSENTIAL HYPERTENSION: ICD-10-CM

## 2023-05-30 DIAGNOSIS — E11.65 TYPE 2 DIABETES MELLITUS WITH HYPERGLYCEMIA, WITHOUT LONG-TERM CURRENT USE OF INSULIN: Primary | ICD-10-CM

## 2023-05-30 DIAGNOSIS — R15.9 INCONTINENCE OF FECES, UNSPECIFIED FECAL INCONTINENCE TYPE: ICD-10-CM

## 2023-05-30 DIAGNOSIS — E78.5 HYPERLIPIDEMIA, UNSPECIFIED HYPERLIPIDEMIA TYPE: ICD-10-CM

## 2023-05-30 PROBLEM — R09.A2 GLOBUS SENSATION: Status: ACTIVE | Noted: 2023-05-30

## 2023-05-30 NOTE — ASSESSMENT & PLAN NOTE
Well-controlled, continue atenolol and nifedipine. Patient should monitor blood pressure at home and call or return to clinic with consistent elevations greater than 130/80.  Reviewed most recent labs.

## 2023-05-30 NOTE — ASSESSMENT & PLAN NOTE
Continue Lasix, statin, beta-blocker.  Discussed with patient that she may take Lasix dosage twice a day as needed, previously took this twice daily scheduled and did well.  Patient defers labs, will get through cardiology and endocrinology.

## 2023-05-30 NOTE — ASSESSMENT & PLAN NOTE
Referral to GI to discuss EGD.  Could consider swallow study and/or CT in the future if warranted.

## 2023-05-30 NOTE — PROGRESS NOTES
Chief Complaint  Diabetes (Follow up )    Subjective         Jeimy Estevez presents to [facility]  Patient previously reported fecal incontinence, now approximately x18 months.  Symptoms have progressively worsened.  Colonoscopy 5/2022 was without concern.  Patient states that symptoms will happen suddenly and without warning.  She does did not use any fiber supplements.  Denies abdominal pain, cramping, hematochezia, nausea, vomiting, diarrhea.  She has also noticed globus sensation, denies dysphagia.  She would like to have an EGD as well as colonoscopy.  Patient does not feel that symptoms correlate with the initiation of metformin or Ozempic.  Previously seen by Dr. Torres.    HTN-  Managed with atenolol and nifedipine. She does not check her blood pressure at home. Denies chest pain, headache, leg swelling.      HLD-  Managed with statin, well tolerated. Denies leg cramping. Most recent LDL 69.     CHF-  Managed with Lasix once daily, statin, BB. Denies shortness of breath, weight gain, orthopnea. Scheduled for follow up with cardiology 8/2023.      DM2-  Currently managed by [facility] with metformin and Ozempic. Reports fasting blood glucose readings have increased to 140s-150s since discontinuing glimepiride. Denies lows. Most recent A1c 7.0. Diabetic eye exam: 2/2023, [facility]. Diabetic foot exam: 6/2022. Urine microalbumin: 11/2022. Renal protection: Declines. Pneumonia vaccination: Declines.     Mammogram: 3/2023  DEXA: 3/2023  Colonoscopy: 5/2020, repeat 2025  COVID19 vaccination: Declines  Shingles vaccination: Declines       Objective     Vitals:    05/30/23 1057   BP: 110/62   Pulse: 69   Temp: 97 °F (36.1 °C)   TempSrc: Temporal   SpO2: 97%   Weight: 83 kg (183 lb)      Body mass index is 31.41 kg/m².    Wt Readings from Last 3 Encounters:   05/30/23 83 kg (183 lb)   02/20/23 83 kg (183 lb)   11/30/22 82.8 kg (182 lb 9.6 oz)     BP  Readings from Last 3 Encounters:   05/30/23 110/62   02/20/23 129/69   11/30/22 114/64                Physical Exam  Constitutional:       Appearance: Normal appearance.   HENT:      Head: Normocephalic and atraumatic.      Nose: Nose normal.      Mouth/Throat:      Mouth: Mucous membranes are moist.      Pharynx: Oropharynx is clear.   Eyes:      Extraocular Movements: Extraocular movements intact.      Conjunctiva/sclera: Conjunctivae normal.      Pupils: Pupils are equal, round, and reactive to light.   Cardiovascular:      Rate and Rhythm: Normal rate and regular rhythm.      Heart sounds: Normal heart sounds.   Pulmonary:      Effort: Pulmonary effort is normal.      Breath sounds: Normal breath sounds.   Skin:     General: Skin is warm and dry.   Neurological:      General: No focal deficit present.      Mental Status: She is alert and oriented to person, place, and time.   Psychiatric:         Mood and Affect: Mood normal.         Behavior: Behavior normal.         Thought Content: Thought content normal.          Result Review :   The following data was reviewed by: EBONIE Jones on 05/30/2023:      Procedures    Assessment and Plan   Diagnoses and all orders for this visit:    1. Type 2 diabetes mellitus with hyperglycemia, without long-term current use of insulin (Primary)  Assessment & Plan:  Well controlled, continue metformin and Ozempic. Most recent HgbA1c 7. Patient should monitor blood glucose levels closely and call or return to clinic with consistent elevations or frequent lows.  Could consider increasing Ozempic dosage with consistent elevations.  Diabetic eye exam: 2/2023, records requested. Diabetic foot exam: 6/2022. Urine microalbumin: 11/2022. Renal protection: Declines. Pneumonia vaccination: Declines.        2. Chronic diastolic (congestive) heart failure  Assessment & Plan:  Continue Lasix, statin, beta-blocker.  Discussed with patient that she may take Lasix dosage twice a day as  needed, previously took this twice daily scheduled and did well.  Patient defers labs, will get through cardiology and endocrinology.      3. Essential hypertension  Assessment & Plan:  Well-controlled, continue atenolol and nifedipine. Patient should monitor blood pressure at home and call or return to clinic with consistent elevations greater than 130/80.  Reviewed most recent labs.        4. Hyperlipidemia, unspecified hyperlipidemia type  Assessment & Plan:  Well controlled, continue statin. Most recent LDL 69.        5. Incontinence of feces, unspecified fecal incontinence type  Assessment & Plan:  Referral to GI for further evaluation and to pursue colonoscopy.    Orders:  -     Ambulatory Referral to Gastroenterology    6. Globus sensation  Assessment & Plan:  Referral to GI to discuss EGD.  Could consider swallow study and/or CT in the future if warranted.    Orders:  -     Ambulatory Referral to Gastroenterology        Follow Up   Return in about 6 months (around 11/30/2023).  Patient was given instructions and counseling regarding her condition or for health maintenance advice. Please see specific information pulled into the AVS if appropriate.

## 2023-05-30 NOTE — ASSESSMENT & PLAN NOTE
Well controlled, continue metformin and Ozempic. Most recent HgbA1c 7. Patient should monitor blood glucose levels closely and call or return to clinic with consistent elevations or frequent lows.  Could consider increasing Ozempic dosage with consistent elevations.  Diabetic eye exam: 2/2023, records requested. Diabetic foot exam: 6/2022. Urine microalbumin: 11/2022. Renal protection: Declines. Pneumonia vaccination: Declines.

## 2023-06-09 NOTE — PROGRESS NOTES
Chief Complaint        Globus sensation, incontinence of feces    History of Present Illness      Jeimy Estevez is a 73 y.o. female who presents to [facility] as a new patient with a history of altered bowel habits, diarrhea, incontinence, reflux, diverticulosis, dysphagia and a personal history of colon polyps.  Patient reports over the past few years that her dysphagia has gradually come about and worsened.  She reports that now is a daily occurrence and occurring with solids liquids and pills.  Patient reports she was at [facility] yesterday and choked on just water.  Patient denies any nausea or vomiting, chest pain or epigastric pain with her dysphagia.  She denies any melena.  Patient denies family history of gastric cancer or esophageal cancer.  Patient has struggled with intermittent reflux for years and takes Tums intermittently.  Patient reports change in her bowel movements that have changed over the past year and have worsened over time.  She reports in April and May experienced 2 episodes of incontinence.  Patient reports formed to loose stool that is variable she can go 2 to 3 days without a bowel movement and then can have multiple bouts of bowel movements.  Patient denies any family history of colorectal cancer.  Patient denies any hematochezia or melena.  Patient does have a personal history of colon polyps.  She reports when she has the need to have a bowel movement she usually has lower abdominal cramps and does have some fecal urgency.  Patient denies fever, nausea, vomiting, weight loss, night sweats, melena, hematochezia, hematemesis.    Colonoscopy: Review of the patient's most recent colonoscopy performed by Dr. Torres on 05.13.2020 several diverticula left side of the colon grade 1 internal hemorrhoids repeat 5 years.    Results       Result Review :   The following data was reviewed by: EBONIE Santana on 06/12/2023           Liver Workup No  results found for: AFPTM, DSDNA, EXPANDEDENA, SMOOTHMUSCAB, CERULOPLSM, FERRITIN, LABIMMURE, TOTIGGREF, IGA, IGM, IRON, TIBC, LABIRON, TRANSFERRIN, MITOAB, PROTIME, INR, AFP         Past Medical History       Past Medical History:   Diagnosis Date    CHF (congestive heart failure)     Chronic diastolic (congestive) heart failure 11/1/2021    Diabetes mellitus     Hyperlipidemia     Hypertension     Poor circulation     seems to have resolved       Past Surgical History:   Procedure Laterality Date    APPENDECTOMY      CARDIAC CATHETERIZATION      CHOLECYSTECTOMY      COLONOSCOPY  2020    [facility]    TONSILLECTOMY      TUBAL ABDOMINAL LIGATION           Current Outpatient Medications:     Accu-Chek FastClix Lancets misc, USE TO CHECK BLOOD SUGAR ONCE A DAY, Disp: , Rfl:     Accu-Chek SmartView test strip, USE AS DIRECTED TO TEST FOUR TIMES DAILY AS NEEDED, Disp: 400 each, Rfl: 1    atenolol (TENORMIN) 50 MG tablet, Take 1 tablet by mouth Daily., Disp: 90 tablet, Rfl: 1    BD Pen Needle Kathy 2nd Gen 32G X 4 MM misc, USE ONCE DAILY WITH INJECTIONS, Disp: , Rfl:     Biotin 5 MG tablet dispersible, biotin 5,000 mcg oral tablet,disintegrating dissolve  1 tablet by oral route daily   Active, Disp: , Rfl:     furosemide (LASIX) 40 MG tablet, TAKE 1 TABLET(40 MG) BY MOUTH TWICE DAILY, Disp: 180 tablet, Rfl: 1    metFORMIN (GLUCOPHAGE) 500 MG tablet, Take 2 tablets by mouth 2 (Two) Times a Day., Disp: , Rfl:     NIFEdipine CC (ADALAT CC) 60 MG 24 hr tablet, Take 1 tablet by mouth Daily., Disp: 90 tablet, Rfl: 1    Semaglutide,0.25 or 0.5MG/DOS, (Ozempic, 0.25 or 0.5 MG/DOSE,) 2 MG/1.5ML solution pen-injector, Inject 0.25 mg under the skin into the appropriate area as directed 1 (One) Time Per Week., Disp: , Rfl:     simvastatin (ZOCOR) 40 MG tablet, TAKE 1 TABLET BY MOUTH DAILY, Disp: 90 tablet, Rfl: 1    Sod Picosulfate-Mag Ox-Cit Acd (Clenpiq) 10-3.5-12 MG-GM -GM/160ML solution, Take 175 mL by mouth 1 (One) Time for 1 dose.  "As directed by office., Disp: 350 mL, Rfl: 0     No Known Allergies    Family History   Problem Relation Age of Onset    Heart attack Mother     No Known Problems Father     Diabetes Paternal Grandmother     Colon cancer Neg Hx         Social History     Social History Narrative    Not on file       Objective       Objective     Vital Signs:   /63 (BP Location: Right arm, Patient Position: Sitting, Cuff Size: Adult)   Pulse 70   Ht 162.6 cm (64\")   Wt 83.9 kg (185 lb)   SpO2 99%   BMI 31.76 kg/m²     Body mass index is 31.76 kg/m².    Physical Exam         Assessment & Plan          Assessment and Plan    Diagnoses and all orders for this visit:    1. Altered bowel habits (Primary)  -     CBC & Differential; Future  -     Iron Profile; Future  -     Comprehensive Metabolic Panel; Future    2. Diarrhea, unspecified type  -     CBC & Differential; Future  -     Iron Profile; Future  -     Comprehensive Metabolic Panel; Future    3. Gastroesophageal reflux disease, unspecified whether esophagitis present  -     CBC & Differential; Future  -     Iron Profile; Future  -     Comprehensive Metabolic Panel; Future    4. Hemorrhoids, unspecified hemorrhoid type  -     CBC & Differential; Future  -     Iron Profile; Future  -     Comprehensive Metabolic Panel; Future    5. Diverticulosis  -     CBC & Differential; Future  -     Iron Profile; Future  -     Comprehensive Metabolic Panel; Future    6. Other specified diabetes mellitus with hyperglycemia, without long-term current use of insulin  -     Iron Profile; Future    7. Oropharyngeal dysphagia    8. Polyp of colon, unspecified part of colon, unspecified type    9. Incontinence of feces, unspecified fecal incontinence type    Other orders  -     Sod Picosulfate-Mag Ox-Cit Acd (Clenpiq) 10-3.5-12 MG-GM -GM/160ML solution; Take 175 mL by mouth 1 (One) Time for 1 dose. As directed by office.  Dispense: 350 mL; Refill: 0    73-year-old female presenting the office " today as a new patient with a history of altered bowel habits, diarrhea, incontinence, reflux, diverticulosis, dysphagia and a personal history of colon polyps.  Plan:  I have recommended that the patient undergo further evaluation with an EGD and colonoscopy.  I have discussed this procedure in detail with the patient.  I have discussed the risks, benefits and alternatives.  I have discussed the risk of anesthesia, bleeding and perforation.  Patient understands these risks, benefits and alternatives and wishes to proceed.  I will schedule her at her earliest convenience.  I have ordered labs as listed above as patient has not had recent lab work to include a CBC, iron profile and CMP.  I have recommended fiber intake related to her fecal incontinence and diarrhea.  Pelvic floor therapy may be a good discussion at follow-up if normal colonoscopy and normal biopsies.  Patient to follow-up in office after endoscopy.  Patient agreeable to this plan will call with any questions or concerns.      Colonoscopy recommended        Follow Up       Follow Up   No follow-ups on file.  Patient was given instructions and counseling regarding her condition or for health maintenance advice. Please see specific information pulled into the AVS if appropriate.

## 2023-06-12 ENCOUNTER — PREP FOR SURGERY (OUTPATIENT)
Dept: GASTROENTEROLOGY | Facility: CLINIC | Age: 73
End: 2023-06-12

## 2023-06-12 ENCOUNTER — OFFICE VISIT (OUTPATIENT)
Dept: GASTROENTEROLOGY | Facility: CLINIC | Age: 73
End: 2023-06-12
Payer: MEDICARE

## 2023-06-12 ENCOUNTER — TELEPHONE (OUTPATIENT)
Dept: GASTROENTEROLOGY | Facility: CLINIC | Age: 73
End: 2023-06-12
Payer: MEDICARE

## 2023-06-12 ENCOUNTER — LAB (OUTPATIENT)
Dept: LAB | Facility: HOSPITAL | Age: 73
End: 2023-06-12
Payer: MEDICARE

## 2023-06-12 VITALS
HEIGHT: 64 IN | SYSTOLIC BLOOD PRESSURE: 130 MMHG | OXYGEN SATURATION: 99 % | BODY MASS INDEX: 31.58 KG/M2 | DIASTOLIC BLOOD PRESSURE: 63 MMHG | HEART RATE: 70 BPM | WEIGHT: 185 LBS

## 2023-06-12 DIAGNOSIS — R19.7 DIARRHEA, UNSPECIFIED TYPE: ICD-10-CM

## 2023-06-12 DIAGNOSIS — K57.90 DIVERTICULOSIS: ICD-10-CM

## 2023-06-12 DIAGNOSIS — K21.9 GASTROESOPHAGEAL REFLUX DISEASE, UNSPECIFIED WHETHER ESOPHAGITIS PRESENT: ICD-10-CM

## 2023-06-12 DIAGNOSIS — R19.4 ALTERED BOWEL HABITS: ICD-10-CM

## 2023-06-12 DIAGNOSIS — R13.12 OROPHARYNGEAL DYSPHAGIA: ICD-10-CM

## 2023-06-12 DIAGNOSIS — E13.65 OTHER SPECIFIED DIABETES MELLITUS WITH HYPERGLYCEMIA, WITHOUT LONG-TERM CURRENT USE OF INSULIN: ICD-10-CM

## 2023-06-12 DIAGNOSIS — K64.9 HEMORRHOIDS, UNSPECIFIED HEMORRHOID TYPE: ICD-10-CM

## 2023-06-12 DIAGNOSIS — K63.5 POLYP OF COLON, UNSPECIFIED PART OF COLON, UNSPECIFIED TYPE: ICD-10-CM

## 2023-06-12 DIAGNOSIS — R15.9 INCONTINENCE OF FECES, UNSPECIFIED FECAL INCONTINENCE TYPE: ICD-10-CM

## 2023-06-12 DIAGNOSIS — R19.4 ALTERED BOWEL HABITS: Primary | ICD-10-CM

## 2023-06-12 LAB
ALBUMIN SERPL-MCNC: 4.8 G/DL (ref 3.5–5.2)
ALBUMIN/GLOB SERPL: 1.7 G/DL
ALP SERPL-CCNC: 85 U/L (ref 39–117)
ALT SERPL W P-5'-P-CCNC: 29 U/L (ref 1–33)
ANION GAP SERPL CALCULATED.3IONS-SCNC: 11 MMOL/L (ref 5–15)
AST SERPL-CCNC: 23 U/L (ref 1–32)
BASOPHILS # BLD AUTO: 0.03 10*3/MM3 (ref 0–0.2)
BASOPHILS NFR BLD AUTO: 0.3 % (ref 0–1.5)
BILIRUB SERPL-MCNC: 0.4 MG/DL (ref 0–1.2)
BUN SERPL-MCNC: 18 MG/DL (ref 8–23)
BUN/CREAT SERPL: 23.7 (ref 7–25)
CALCIUM SPEC-SCNC: 10.1 MG/DL (ref 8.6–10.5)
CHLORIDE SERPL-SCNC: 104 MMOL/L (ref 98–107)
CO2 SERPL-SCNC: 25 MMOL/L (ref 22–29)
CREAT SERPL-MCNC: 0.76 MG/DL (ref 0.57–1)
DEPRECATED RDW RBC AUTO: 37.1 FL (ref 37–54)
EGFRCR SERPLBLD CKD-EPI 2021: 83.4 ML/MIN/1.73
EOSINOPHIL # BLD AUTO: 0.07 10*3/MM3 (ref 0–0.4)
EOSINOPHIL NFR BLD AUTO: 0.8 % (ref 0.3–6.2)
ERYTHROCYTE [DISTWIDTH] IN BLOOD BY AUTOMATED COUNT: 12 % (ref 12.3–15.4)
GLOBULIN UR ELPH-MCNC: 2.9 GM/DL
GLUCOSE SERPL-MCNC: 168 MG/DL (ref 65–99)
HCT VFR BLD AUTO: 44.1 % (ref 34–46.6)
HGB BLD-MCNC: 15.2 G/DL (ref 12–15.9)
IMM GRANULOCYTES # BLD AUTO: 0.02 10*3/MM3 (ref 0–0.05)
IMM GRANULOCYTES NFR BLD AUTO: 0.2 % (ref 0–0.5)
IRON 24H UR-MRATE: 69 MCG/DL (ref 37–145)
IRON SATN MFR SERPL: 14 % (ref 20–50)
LYMPHOCYTES # BLD AUTO: 2.86 10*3/MM3 (ref 0.7–3.1)
LYMPHOCYTES NFR BLD AUTO: 32.9 % (ref 19.6–45.3)
MCH RBC QN AUTO: 29.4 PG (ref 26.6–33)
MCHC RBC AUTO-ENTMCNC: 34.5 G/DL (ref 31.5–35.7)
MCV RBC AUTO: 85.3 FL (ref 79–97)
MONOCYTES # BLD AUTO: 0.75 10*3/MM3 (ref 0.1–0.9)
MONOCYTES NFR BLD AUTO: 8.6 % (ref 5–12)
NEUTROPHILS NFR BLD AUTO: 4.97 10*3/MM3 (ref 1.7–7)
NEUTROPHILS NFR BLD AUTO: 57.2 % (ref 42.7–76)
NRBC BLD AUTO-RTO: 0 /100 WBC (ref 0–0.2)
PLATELET # BLD AUTO: 242 10*3/MM3 (ref 140–450)
PMV BLD AUTO: 12.3 FL (ref 6–12)
POTASSIUM SERPL-SCNC: 4 MMOL/L (ref 3.5–5.2)
PROT SERPL-MCNC: 7.7 G/DL (ref 6–8.5)
RBC # BLD AUTO: 5.17 10*6/MM3 (ref 3.77–5.28)
SODIUM SERPL-SCNC: 140 MMOL/L (ref 136–145)
TIBC SERPL-MCNC: 493 MCG/DL (ref 298–536)
TRANSFERRIN SERPL-MCNC: 331 MG/DL (ref 200–360)
WBC NRBC COR # BLD: 8.7 10*3/MM3 (ref 3.4–10.8)

## 2023-06-12 PROCEDURE — 36415 COLL VENOUS BLD VENIPUNCTURE: CPT

## 2023-06-12 PROCEDURE — 1160F RVW MEDS BY RX/DR IN RCRD: CPT | Performed by: NURSE PRACTITIONER

## 2023-06-12 PROCEDURE — 3075F SYST BP GE 130 - 139MM HG: CPT | Performed by: NURSE PRACTITIONER

## 2023-06-12 PROCEDURE — 1159F MED LIST DOCD IN RCRD: CPT | Performed by: NURSE PRACTITIONER

## 2023-06-12 PROCEDURE — 99214 OFFICE O/P EST MOD 30 MIN: CPT | Performed by: NURSE PRACTITIONER

## 2023-06-12 PROCEDURE — 80053 COMPREHEN METABOLIC PANEL: CPT

## 2023-06-12 PROCEDURE — 3078F DIAST BP <80 MM HG: CPT | Performed by: NURSE PRACTITIONER

## 2023-06-12 PROCEDURE — 84466 ASSAY OF TRANSFERRIN: CPT

## 2023-06-12 PROCEDURE — 83540 ASSAY OF IRON: CPT

## 2023-06-12 PROCEDURE — 85025 COMPLETE CBC W/AUTO DIFF WBC: CPT

## 2023-06-12 RX ORDER — SODIUM PICOSULFATE, MAGNESIUM OXIDE, AND ANHYDROUS CITRIC ACID 10; 3.5; 12 MG/160ML; G/160ML; G/160ML
175 LIQUID ORAL ONCE
Qty: 350 ML | Refills: 0 | Status: SHIPPED | OUTPATIENT
Start: 2023-06-12 | End: 2023-06-12

## 2023-06-12 NOTE — PATIENT INSTRUCTIONS
Food Choices for Gastroesophageal Reflux Disease, Adult  When you have gastroesophageal reflux disease (GERD), the foods you eat and your eating habits are very important. Choosing the right foods can help ease your discomfort. Think about working with a food expert (dietitian) to help you make good choices.  What are tips for following this plan?  Reading food labels  Look for foods that are low in saturated fat. Foods that may help with your symptoms include:  Foods that have less than 5% of daily value (DV) of fat.  Foods that have 0 grams of trans fat.  Cooking  Do not clifton your food.  Cook your food by baking, steaming, grilling, or broiling. These are all methods that do not need a lot of fat for cooking.  To add flavor, try to use herbs that are low in spice and acidity.  Meal planning    Choose healthy foods that are low in fat, such as:  Fruits and vegetables.  Whole grains.  Low-fat dairy products.  Lean meats, fish, and poultry.  Eat small meals often instead of eating 3 large meals each day. Eat your meals slowly in a place where you are relaxed. Avoid bending over or lying down until 2-3 hours after eating.  Limit high-fat foods such as fatty meats or fried foods.  Limit your intake of fatty foods, such as oils, butter, and shortening.  Avoid the following as told by your doctor:  Foods that cause symptoms. These may be different for different people. Keep a food diary to keep track of foods that cause symptoms.  Alcohol.  Drinking a lot of liquid with meals.  Eating meals during the 2-3 hours before bed.  Lifestyle  Stay at a healthy weight. Ask your doctor what weight is healthy for you. If you need to lose weight, work with your doctor to do so safely.  Exercise for at least 30 minutes on 5 or more days each week, or as told by your doctor.  Wear loose-fitting clothes.  Do not smoke or use any products that contain nicotine or tobacco. If you need help quitting, ask your doctor.  Sleep with the head  of your bed higher than your feet. Use a wedge under the mattress or blocks under the bed frame to raise the head of the bed.  Chew sugar-free gum after meals.  What foods should eat?  Eat a healthy, well-balanced diet of fruits, vegetables, whole grains, low-fat dairy products, lean meats, fish, and poultry. Each person is different.  Foods that may cause symptoms in one person may not cause any symptoms in another person. Work with your doctor to find foods that are safe for you.  The items listed above may not be a complete list of what you can eat and drink. Contact a food expert for more options.  What foods should I avoid?  Limiting some of these foods may help in managing the symptoms of GERD. Everyone is different. Talk with a food expert or your doctor to help you find the exact foods to avoid, if any.  Fruits  Any fruits prepared with added fat. Any fruits that cause symptoms. For some people, this may include citrus fruits, such as oranges, grapefruit, pineapple, and lorene.  Vegetables  Deep-fried vegetables. French fries. Any vegetables prepared with added fat. Any vegetables that cause symptoms. For some people, this may include tomatoes and tomato products, chili peppers, onions and garlic, and horseradish.  Grains  Pastries or quick breads with added fat.  Meats and other proteins  High-fat meats, such as fatty beef or pork, hot dogs, ribs, ham, sausage, salami, and elliott. Fried meat or protein, including fried fish and fried chicken. Nuts and nut butters, in large amounts.  Dairy  Whole milk and chocolate milk. Sour cream. Cream. Ice cream. Cream cheese. Milkshakes.  Fats and oils  Butter. Margarine. Shortening. Ghee.  Beverages  Coffee and tea, with or without caffeine. Carbonated beverages. Sodas. Energy drinks. Fruit juice made with acidic fruits, such as orange or grapefruit. Tomato juice. Alcoholic drinks.  Sweets and desserts  Chocolate and cocoa. Donuts.  Seasonings and condiments  Pepper.  Peppermint and spearmint. Added salt. Any condiments, herbs, or seasonings that cause symptoms. For some people, this may include sánchez, hot sauce, or vinegar-based salad dressings.  The items listed above may not be a complete list of what you should not eat and drink. Contact a food expert for more options.  Questions to ask your doctor  Diet and lifestyle changes are often the first steps that are taken to manage symptoms of GERD. If diet and lifestyle changes do not help, talk with your doctor about taking medicines.  Where to find more information  International Foundation for Gastrointestinal Disorders: aboutgerd.org  Summary  When you have GERD, food and lifestyle choices are very important in easing your symptoms.  Eat small meals often instead of 3 large meals a day. Eat your meals slowly and in a place where you are relaxed.  Avoid bending over or lying down until 2-3 hours after eating.  Limit high-fat foods such as fatty meats or fried foods.  This information is not intended to replace advice given to you by your health care provider. Make sure you discuss any questions you have with your health care provider.  Document Revised: 06/28/2021 Document Reviewed: 06/28/2021  [employer] Patient Education © 2022 [employer]  Diarrhea, Adult  Diarrhea is frequent loose and watery bowel movements. Diarrhea can make you feel weak and cause you to become dehydrated. Dehydration can make you tired and thirsty, cause you to have a dry mouth, and decrease how often you urinate.  Diarrhea typically lasts 2-3 days. However, it can last longer if it is a sign of something more serious. It is important to treat your diarrhea as told by your health care provider.  Follow these instructions at home:  Eating and drinking     Follow these recommendations as told by your health care provider:  Take an oral rehydration solution (ORS). This is an over-the-counter medicine that helps return your body to its normal balance of  nutrients and water. It is found at pharmacies and retail stores.  Drink plenty of fluids, such as water, ice chips, diluted fruit juice, and low-calorie sports drinks. You can drink milk also, if desired.  Avoid drinking fluids that contain a lot of sugar or caffeine, such as energy drinks, sports drinks, and soda.  Eat bland, easy-to-digest foods in small amounts as you are able. These foods include bananas, applesauce, rice, lean meats, toast, and crackers.  Avoid alcohol.  Avoid spicy or fatty foods.    Medicines  Take over-the-counter and prescription medicines only as told by your health care provider.  If you were prescribed an antibiotic medicine, take it as told by your health care provider. Do not stop using the antibiotic even if you start to feel better.  General instructions    Wash your hands often using soap and water. If soap and water are not available, use a hand . Others in the household should wash their hands as well. Hands should be washed:  After using the toilet or changing a diaper.  Before preparing, cooking, or serving food.  While caring for a sick person or while visiting someone in a hospital.  Drink enough fluid to keep your urine pale yellow.  Rest at home while you recover.  Watch your condition for any changes.  Take a warm bath to relieve any burning or pain from frequent diarrhea episodes.  Keep all follow-up visits as told by your health care provider. This is important.  Contact a health care provider if:  You have a fever.  Your diarrhea gets worse.  You have new symptoms.  You cannot keep fluids down.  You feel light-headed or dizzy.  You have a headache.  You have muscle cramps.  Get help right away if:  You have chest pain.  You feel extremely weak or you faint.  You have bloody or black stools or stools that look like tar.  You have severe pain, cramping, or bloating in your abdomen.  You have trouble breathing or you are breathing very quickly.  Your heart is  beating very quickly.  Your skin feels cold and clammy.  You feel confused.  You have signs of dehydration, such as:  Dark urine, very little urine, or no urine.  Cracked lips.  Dry mouth.  Sunken eyes.  Sleepiness.  Weakness.  Summary  Diarrhea is frequent loose and sometimes watery bowel movements. Diarrhea can make you feel weak and cause you to become dehydrated.  Drink enough fluids to keep your urine pale yellow.  Make sure that you wash your hands after using the toilet. If soap and water are not available, use hand .  Contact a health care provider if your diarrhea gets worse or you have new symptoms.  Get help right away if you have signs of dehydration.  This information is not intended to replace advice given to you by your health care provider. Make sure you discuss any questions you have with your health care provider.  Document Revised: 06/29/2022 Document Reviewed: 06/29/2022  [employer] Patient Education © 2022 [employer]

## 2023-06-12 NOTE — TELEPHONE ENCOUNTER
6/12/2023    Dear DR FRANCES GUZMAN,     Patient: Jeimy Estevez   YOB: 1950        This patient is waiting to have a Colonoscopy and Esophagogastroduodenoscopy which I will perform at [facility] on 07/10/23.     Please respond to this request noting your recommendations. You may contact our office at [phone] Option 1 with any questions. I appreciate your prompt response in this matter.     Please return this form to our office no later than two weeks prior to the procedure date listed above. Please return form to [phone].     ____ I approve my patient from a cardiac standpoint.    ____ I do NOT approve my patient from a cardiac standpoint at this time.    Please specify clearance expiration date:_____________________________    Approving physician name (please print):     _____________________________________________    Approving physician signature:     ________________________________    Date:________________        Sincerely,  [facility]   Gastroenterology - Dr.KEVIN ARDON

## 2023-06-12 NOTE — TELEPHONE ENCOUNTER
Procedure: Colonoscopy and/or EGD     Med Directive: NA     PMH: CHF, HTN, HLD     Last Seen: 2/20/23

## 2023-06-15 ENCOUNTER — PATIENT ROUNDING (BHMG ONLY) (OUTPATIENT)
Dept: GASTROENTEROLOGY | Facility: CLINIC | Age: 73
End: 2023-06-15
Payer: MEDICARE

## 2023-06-15 NOTE — PROGRESS NOTES
6/15/2023      Hello, may I speak with Jeimy Estevez     My name is Oral. I am calling from [facility] [place]. I show that you had a recent visit with EBONIE Santana.    Before we get started may I verify your date of birth? 1950    I am calling to officially welcome you to our practice and ask about your recent visit. Is this a good time to talk? No. I left patient a voicemail.     Tell me about your visit with us. What things went well?    We strive to ensure that we protect your safety and privacy. Is there anything we could have done to improve this during your visit?        We're always looking for ways to make our patients' experiences even better. Do you have recommendations on ways we may improve?    Overall were you satisfied with your first visit to our practice?    I appreciate you taking the time to speak with me today. Is there anything else I can do for you?    I am glad to hear that you had a very good visit and I appreciate you taking the time to provide feedback on this call. We would greatly appreciate you filling out a survey if you receive one in the mail, email or text. This is a great opportunity to provide any additional feedback that you may think of after this call as well.       Thank you, and have a great day.

## 2023-08-16 ENCOUNTER — TREATMENT (OUTPATIENT)
Dept: PHYSICAL THERAPY | Facility: CLINIC | Age: 73
End: 2023-08-16
Payer: MEDICARE

## 2023-08-16 DIAGNOSIS — R15.9 INCONTINENCE OF FECES, UNSPECIFIED FECAL INCONTINENCE TYPE: Primary | ICD-10-CM

## 2023-08-16 DIAGNOSIS — N81.89 PELVIC FLOOR WEAKNESS: ICD-10-CM

## 2023-08-16 NOTE — PROGRESS NOTES
Physical Therapy Initial Evaluation and Plan of Care  [address], [place] [address]      Patient: Jeimy Estevez   : 1950  Diagnosis/ICD-10 Code:  Incontinence of feces, unspecified fecal incontinence type [R15.9]  Referring practitioner: EBONIE Franz  Date of Initial Visit: 2023  Today's Date: 2023  Patient seen for 1 sessions           Subjective Questionnaire: Colorectal anal distress inventory score 13=41% impairment      Subjective Evaluation    History of Present Illness  Mechanism of injury: Patient is a 73 yr old female referred to physical therapy with diagnosis of Incontinence of feces. Patient reports difficulty holding bowels and then after colonoscopy has not had any issues lately.  Patient states leaking feces without feeling any urge, but patient also has times where she urgently needs to rush to bathroom for bowel movements.  Patient has cramps at times with bowel movements. Patient states difficulty to control fecal continence is stool is loose/moderately loose.            Objective          Functional Assessment     Comments  Verbal consent obtained for internal pelvic exam/treatment.  Pelvic floor strength 2/5; able to maintain contraction 3 seconds  No prolapse noted with valsalva  Observation: hemorrhoids          Assessment & Plan       Assessment  Impairments: impaired physical strength and lacks appropriate home exercise program   Other impairment: fecal incontinence  Assessment details: Pt presents with limitations, noted by evaluation that impede patient's ability to maintain fecal continence.  The skills of a therapist will be required to safely and effectively implement the following treatment plan to restore maximal level of function.      Prognosis: good    Goals  Plan Goals: 1.Fecal incontinence      LT weeks:The patient will report 75% decrease in fecal incontinence       Status: New      ST weeks: The patient will report 50%  decrease in fecal incontience        Status: New  TREATMENT:  Therapeutic exercise to increase strength and endurance of the pelvic floor, biofeedback as needed to aid in the strengthening process, patient education on extensive HEP, patient education on urge control techniques and pelvic bracing during cough, sneeze, etc.     2.  Pelvic floor weakness   LTG2: 12 weeks: Patient will present with 3+/5 strength of the pelvic floor musculature with patient reporting 75% improvement with complaints of fecal incontinence  STATUS:  New   STG2a: 6 weeks:  Strength of the pelvic floor musculature at 3/5.   STATUS:  New   Treatment:  Therapeutic exercise to increase strength and endurance of the pelvic floor, biofeedback as needed to aid in the strengthening process, patient education on extensive HEP, patient education on urge control techniques and pelvic bracing.     3. Other PT Primary Functional Limitation     LTG 3: 12 weeks:  The patient will demonstrate 19% limitation by achieving a score of 6 on the colorectal anal distress inventory.    STATUS:  New   STG 3a: 6 weeks:  The patient will demonstrate independence and compliance with home exercise program.    STATUS:  New       Plan  Therapy options: will be seen for skilled therapy services  Planned therapy interventions: abdominal trunk stabilization, strengthening, home exercise program and therapeutic activities  Frequency: 1x month  Duration in weeks: 12  Treatment plan discussed with: patient  History # of Personal Factors and/or Comorbidities: LOW (0)  Examination of Body System(s): # of elements: LOW (1-2)  Clinical Presentation: STABLE   Clinical Decision Making: LOW       Visit Diagnoses:    ICD-10-CM ICD-9-CM   1. Incontinence of feces, unspecified fecal incontinence type  R15.9 787.60   2. Pelvic floor weakness  N81.89 618.89       Timed:  Manual Therapy:         mins  87446;  Therapeutic Exercise:    10     mins  64371;     Neuromuscular [place]:        mins   27400;    Therapeutic Activity:          mins  57915;     Gait Training:           mins  68132;     Ultrasound:          mins  83591;    Electrical Stimulation:         mins  28393 ( );    Untimed:  Electrical Stimulation:         mins  72418 ( );  Mechanical Traction:         mins  42646;    PT evaluation     Low Eval                        30   Mins  56530  Mod Eval                             Mins  97419  High Eval                           Mins  57214    Timed Treatment:   10   mins   Total Treatment:     40   mins    PT SIGNATURE: Julia Mcdonough, PT     Electronically singed 8/16/2023    [place] PT license: [id number]        Initial Certification  Certification Period: 8/16/2023 thru 11/13/2023  I certify that the therapy services are furnished while this patient is under my care.  The services outlined above are required by this patient, and will be reviewed every 90 days.    PHYSICIAN: Yolanda Perales APRN  NPI: [id number]                                      DATE:     Please sign and return via fax to [phone]  Thank you, [facility].

## 2023-08-21 ENCOUNTER — OFFICE VISIT (OUTPATIENT)
Dept: CARDIOLOGY | Facility: CLINIC | Age: 73
End: 2023-08-21
Payer: MEDICARE

## 2023-08-21 VITALS
BODY MASS INDEX: 30.66 KG/M2 | SYSTOLIC BLOOD PRESSURE: 113 MMHG | HEIGHT: 65 IN | HEART RATE: 69 BPM | WEIGHT: 184 LBS | DIASTOLIC BLOOD PRESSURE: 59 MMHG

## 2023-08-21 DIAGNOSIS — I10 ESSENTIAL HYPERTENSION: ICD-10-CM

## 2023-08-21 DIAGNOSIS — I50.32 CHRONIC DIASTOLIC (CONGESTIVE) HEART FAILURE: Primary | ICD-10-CM

## 2023-08-21 DIAGNOSIS — E78.2 MIXED HYPERLIPIDEMIA: ICD-10-CM

## 2023-08-21 PROBLEM — R19.7 DIARRHEA: Status: RESOLVED | Noted: 2023-06-12 | Resolved: 2023-08-21

## 2023-08-21 PROCEDURE — 99214 OFFICE O/P EST MOD 30 MIN: CPT | Performed by: NURSE PRACTITIONER

## 2023-08-21 PROCEDURE — 1159F MED LIST DOCD IN RCRD: CPT | Performed by: NURSE PRACTITIONER

## 2023-08-21 PROCEDURE — 93000 ELECTROCARDIOGRAM COMPLETE: CPT | Performed by: NURSE PRACTITIONER

## 2023-08-21 PROCEDURE — 3078F DIAST BP <80 MM HG: CPT | Performed by: NURSE PRACTITIONER

## 2023-08-21 PROCEDURE — 1160F RVW MEDS BY RX/DR IN RCRD: CPT | Performed by: NURSE PRACTITIONER

## 2023-08-21 PROCEDURE — 3074F SYST BP LT 130 MM HG: CPT | Performed by: NURSE PRACTITIONER

## 2023-08-21 RX ORDER — FUROSEMIDE 40 MG/1
TABLET ORAL
Qty: 180 TABLET | Refills: 3 | Status: SHIPPED | OUTPATIENT
Start: 2023-08-21

## 2023-08-21 RX ORDER — NIFEDIPINE 60 MG/1
60 TABLET, FILM COATED, EXTENDED RELEASE ORAL DAILY
Qty: 90 TABLET | Refills: 3 | Status: SHIPPED | OUTPATIENT
Start: 2023-08-21

## 2023-08-21 RX ORDER — SIMVASTATIN 40 MG
40 TABLET ORAL DAILY
Qty: 90 TABLET | Refills: 3 | Status: SHIPPED | OUTPATIENT
Start: 2023-08-21

## 2023-08-21 NOTE — PROGRESS NOTES
Chief Complaint  Follow-up, Congestive Heart Failure, Hyperlipidemia, and Hypertension    Subjective            History of Present Illness  Jeimy Estevez is a 73-year-old white/ female patient who presents to the office today for follow-up.  She has congestive heart failure, hypertension, and hyperlipidemia.  She is compliant with medications.  She admits to some bilateral lower extremities and feels the lasix dose is managing it well. She denies any chest pain, shortness of breath, lightheadedness/dizziness, or palpitations.    PMH  Past Medical History:   Diagnosis Date    CHF (congestive heart failure)     Chronic diastolic (congestive) heart failure 11/01/2021    Colon polyps     Diabetes mellitus     Diverticulosis     Hemorrhoids     Hyperlipidemia     Hypertension     Poor circulation     seems to have resolved         ALLERGY  No Known Allergies       SURGICALHX  Past Surgical History:   Procedure Laterality Date    APPENDECTOMY      CARDIAC CATHETERIZATION      CHOLECYSTECTOMY      COLONOSCOPY  2020    [facility]    COLONOSCOPY N/A 7/10/2023    Procedure: COLONOSCOPY;  Surgeon: Ronald Torres MD;  Location: [facility] ENDOSCOPY;  Service: Gastroenterology;  Laterality: N/A;  diverticulosis    ENDOSCOPY N/A 7/10/2023    Procedure: ESOPHAGOGASTRODUODENOSCOPY with biopsies, dilation with 15- 18 mm balloon;  Surgeon: Ronald Torres MD;  Location: [facility] ENDOSCOPY;  Service: Gastroenterology;  Laterality: N/A;  hiatal hernia, esophageal stricture    TONSILLECTOMY      TUBAL ABDOMINAL LIGATION            SOC  Social History     Socioeconomic History    Marital status:    Tobacco Use    Smoking status: Never     Passive exposure: Past    Smokeless tobacco: Never   Vaping Use    Vaping Use: Never used   Substance and Sexual Activity    Alcohol use: Never    Drug use: Never    Sexual activity: Defer         FAMHX  Family History   Problem Relation Age of Onset    Heart attack Mother      "No Known Problems Father     Diabetes Paternal Grandmother     Colon cancer Neg Hx           MEDSIGONLY  Current Outpatient Medications on File Prior to Visit   Medication Sig    Accu-Chek FastClix Lancets misc USE TO CHECK BLOOD SUGAR ONCE A DAY    Accu-Chek SmartView test strip USE AS DIRECTED TO TEST FOUR TIMES DAILY AS NEEDED    atenolol (TENORMIN) 50 MG tablet Take 1 tablet by mouth Daily.    BD Pen Needle Kathy 2nd Gen 32G X 4 MM misc USE ONCE DAILY WITH INJECTIONS    Biotin 5 MG tablet dispersible biotin 5,000 mcg oral tablet,disintegrating dissolve  1 tablet by oral route daily   Active    furosemide (LASIX) 40 MG tablet TAKE 1 TABLET(40 MG) BY MOUTH TWICE DAILY (Patient taking differently: Take 1 tablet by mouth Daily.)    metFORMIN (GLUCOPHAGE) 500 MG tablet Take 2 tablets by mouth 2 (Two) Times a Day.    NIFEdipine CC (ADALAT CC) 60 MG 24 hr tablet Take 1 tablet by mouth Daily.    Semaglutide,0.25 or 0.5MG/DOS, (Ozempic, 0.25 or 0.5 MG/DOSE,) 2 MG/1.5ML solution pen-injector Inject 0.5 mg under the skin into the appropriate area as directed 1 (One) Time Per Week.    simvastatin (ZOCOR) 40 MG tablet TAKE 1 TABLET BY MOUTH DAILY     No current facility-administered medications on file prior to visit.         Objective   /59   Pulse 69   Ht 165.1 cm (65\")   Wt 83.5 kg (184 lb)   BMI 30.62 kg/mý       Physical Exam  HENT:      Head: Normocephalic.   Neck:      Vascular: No carotid bruit.   Cardiovascular:      Rate and Rhythm: Normal rate and regular rhythm.      Pulses: Normal pulses.      Heart sounds: Normal heart sounds. No murmur heard.  Pulmonary:      Effort: Pulmonary effort is normal.      Breath sounds: Normal breath sounds.   Musculoskeletal:      Cervical back: Neck supple.      Right lower leg: No edema.      Left lower leg: No edema.   Skin:     General: Skin is dry.   Neurological:      Mental Status: She is alert and oriented to person, place, and time.   Psychiatric:         " Behavior: Behavior normal.     ECG 12 Lead    Date/Time: 8/21/2023 10:51 AM  Performed by: Coretta Gonzalez APRN  Authorized by: Coretta Gonzalez APRN   Comparison: compared with previous ECG from 11/2/2021  Similar to previous ECG  Rhythm: sinus rhythm  Rate: normal  BPM: 67  Conduction: conduction normal  ST Segments: ST segments normal  T Waves: T waves normal  Other findings: low voltage    Clinical impression: normal ECG    Result Review :   The following data was reviewed by: EBONIE Grady on 08/21/2023:  No results found for: PROBNP  CMP          6/12/2023    10:27   CMP   Glucose 168    BUN 18    Creatinine 0.76    EGFR 83.4    Sodium 140    Potassium 4.0    Chloride 104    Calcium 10.1    Total Protein 7.7    Albumin 4.8    Globulin 2.9    Total Bilirubin 0.4    Alkaline Phosphatase 85    AST (SGOT) 23    ALT (SGPT) 29    Albumin/Globulin Ratio 1.7    BUN/Creatinine Ratio 23.7    Anion Gap 11.0      CBC w/diff          6/12/2023    10:27   CBC w/Diff   WBC 8.70    RBC 5.17    Hemoglobin 15.2    Hematocrit 44.1    MCV 85.3    MCH 29.4    MCHC 34.5    RDW 12.0    Platelets 242    Neutrophil Rel % 57.2    Immature Granulocyte Rel % 0.2    Lymphocyte Rel % 32.9    Monocyte Rel % 8.6    Eosinophil Rel % 0.8    Basophil Rel % 0.3       Lab Results   Component Value Date    TSH 2.440 11/29/2021      Lab Results   Component Value Date    FREET4 1.2 07/21/2020      No results found for: DDIMERQUANT  No results found for: MG   No results found for: DIGOXIN   No results found for: TROPONINT        Lipid Panel          11/10/2022    12:14   Lipid Panel   Total Cholesterol 166       Triglycerides 149       HDL Cholesterol 67       LDL Cholesterol  69              Assessment and Plan    Diagnoses and all orders for this visit:    1. Chronic diastolic (congestive) heart failure (Primary)  Symptomatically improved and euvolemic on exam today, continue Lasix 40 mg daily.  She can take an extra dose for  weight gain of 5 pounds or more in 3 to 5-day span.  We talked about reducing fluid and sodium intake, compression socks, and daily weight.  -     ECG 12 Lead    2. Essential hypertension  Currently controlled and without adverse effects from medication, continue atenolol 50 mg daily and nifedipine 60 mg daily.    3. Mixed hyperlipidemia  Last lipid panel was 11/10/2022 with LDL 69 which is within her goal range, continue simvastatin 40 mg daily.      Other orders  -     NIFEdipine CC (ADALAT CC) 60 MG 24 hr tablet; Take 1 tablet by mouth Daily.  Dispense: 90 tablet; Refill: 3  -     simvastatin (ZOCOR) 40 MG tablet; Take 1 tablet by mouth Daily.  Dispense: 90 tablet; Refill: 3  -     furosemide (LASIX) 40 MG tablet; Take 1 tablet by mouth Daily. May also take 1 tablet Daily As Needed (for leg swelling or shortness of breath). Dispense: 180 tablet; Refill: 3        Follow Up   Return in about 6 months (around 2/21/2024) for Follow up with Dr Triplett.    Patient was given instructions and counseling regarding her condition or for health maintenance advice. Please see specific information pulled into the AVS if appropriate.     Jeimy KAREEM Estevez  reports that she has never smoked. She has been exposed to tobacco smoke. She has never used smokeless tobacco.         EBONIE Grady  08/21/23  11:14 EDT    Dictated Utilizing Dragon Dictation

## 2023-11-09 ENCOUNTER — DOCUMENTATION (OUTPATIENT)
Dept: PHYSICAL THERAPY | Facility: CLINIC | Age: 73
End: 2023-11-09
Payer: MEDICARE

## 2023-11-09 NOTE — PROGRESS NOTES
Patient only attended PT evaluation on 8/16/23. Patient called and cancelled her appt on 9/21/23 bc she did not feel like coming to therapy. Discharge

## 2023-11-21 ENCOUNTER — OFFICE VISIT (OUTPATIENT)
Dept: GASTROENTEROLOGY | Facility: CLINIC | Age: 73
End: 2023-11-21
Payer: MEDICARE

## 2023-11-21 VITALS
DIASTOLIC BLOOD PRESSURE: 67 MMHG | BODY MASS INDEX: 30.32 KG/M2 | OXYGEN SATURATION: 100 % | SYSTOLIC BLOOD PRESSURE: 122 MMHG | HEIGHT: 65 IN | WEIGHT: 182 LBS | HEART RATE: 65 BPM

## 2023-11-21 DIAGNOSIS — K21.9 GASTROESOPHAGEAL REFLUX DISEASE WITHOUT ESOPHAGITIS: ICD-10-CM

## 2023-11-21 DIAGNOSIS — R15.9 INCONTINENCE OF FECES, UNSPECIFIED FECAL INCONTINENCE TYPE: Primary | ICD-10-CM

## 2023-11-21 DIAGNOSIS — R15.9 FULL INCONTINENCE OF FECES: Primary | ICD-10-CM

## 2023-11-21 DIAGNOSIS — R13.19 ESOPHAGEAL DYSPHAGIA: ICD-10-CM

## 2023-11-21 NOTE — PROGRESS NOTES
Chief Complaint    Jeimy Estevez is a 73 y.o. female who presents to [facility] for follow-up after recent EGD and colonoscopy performed in July of this year.  Esophagus showed a small hiatal hernia and esophageal stricture dilated with an 18 mm balloon.  No malignancy was seen.  Colonoscopy showed diverticulosis but otherwise unremarkable.  She returns now still having episodes of fecal incontinence not often but enough to cause fear of leaving the house or attending her regular daily activities.  She was seen by physical therapy for pelvic floor evaluation on 1 occasion and did not return.  Evaluation was reviewed.  Patient returns now is having slightly more increased episodes of accidents of stool.  She describes at least 1 episode per week and sometimes 2 and 3 accidents per week.  It is uncertain if she really participated in physical therapy recommendations.    Result Review :     The following data was reviewed by: Ronald Torres MD on 11/21/2023:             Past Medical History:   Diagnosis Date    CHF (congestive heart failure)     Chronic diastolic (congestive) heart failure 11/01/2021    Colon polyps     Diabetes mellitus     Diverticulosis     Hemorrhoids     Hyperlipidemia     Hypertension     Poor circulation     seems to have resolved       Past Surgical History:   Procedure Laterality Date    APPENDECTOMY      CARDIAC CATHETERIZATION      CHOLECYSTECTOMY      COLONOSCOPY  2020    [facility]    COLONOSCOPY N/A 7/10/2023    Procedure: COLONOSCOPY;  Surgeon: Ronald Torres MD;  Location: [facility] ENDOSCOPY;  Service: Gastroenterology;  Laterality: N/A;  diverticulosis    ENDOSCOPY N/A 7/10/2023    Procedure: ESOPHAGOGASTRODUODENOSCOPY with biopsies, dilation with 15- 18 mm balloon;  Surgeon: Ronald Torres MD;  Location: [facility] ENDOSCOPY;  Service: Gastroenterology;  Laterality: N/A;  hiatal hernia, esophageal stricture    TONSILLECTOMY       "TUBAL ABDOMINAL LIGATION         Social History     Social History Narrative    Not on file       Objective     Vital Signs:   /67 (BP Location: Right arm, Patient Position: Sitting, Cuff Size: Adult)   Pulse 65   Ht 165.1 cm (65\")   Wt 82.6 kg (182 lb)   SpO2 100%   BMI 30.29 kg/m²     Body mass index is 30.29 kg/m².    Physical Exam            Assessment and Plan    Diagnoses and all orders for this visit:    1. Full incontinence of feces (Primary)    2. Gastroesophageal reflux disease without esophagitis    3. Esophageal dysphagia    Given the patient's continued episodes of fecal incontinence which she reports has somewhat worsened.  I will she be evaluated by colorectal surgery for fecal incontinence.  In the meantime I recommended she trial fiber supplements which she had not yet done.  She will then return to see us in 6 months after surgical evaluation for possible sacral nerve root stimulator.              Follow Up   No follow-ups on file.  Patient was given instructions and counseling regarding her condition or for health maintenance advice. Please see specific information pulled into the AVS if appropriate.     "

## 2023-12-01 ENCOUNTER — OFFICE VISIT (OUTPATIENT)
Dept: INTERNAL MEDICINE | Facility: CLINIC | Age: 73
End: 2023-12-01
Payer: MEDICARE

## 2023-12-01 VITALS
HEART RATE: 69 BPM | BODY MASS INDEX: 30.69 KG/M2 | DIASTOLIC BLOOD PRESSURE: 63 MMHG | OXYGEN SATURATION: 98 % | SYSTOLIC BLOOD PRESSURE: 120 MMHG | WEIGHT: 184.4 LBS | TEMPERATURE: 97 F

## 2023-12-01 DIAGNOSIS — Z13.9 SCREENING FOR CONDITION: ICD-10-CM

## 2023-12-01 DIAGNOSIS — E11.65 TYPE 2 DIABETES MELLITUS WITH HYPERGLYCEMIA, WITHOUT LONG-TERM CURRENT USE OF INSULIN: ICD-10-CM

## 2023-12-01 DIAGNOSIS — I10 ESSENTIAL HYPERTENSION: ICD-10-CM

## 2023-12-01 DIAGNOSIS — E78.5 HYPERLIPIDEMIA, UNSPECIFIED HYPERLIPIDEMIA TYPE: ICD-10-CM

## 2023-12-01 DIAGNOSIS — Z00.00 MEDICARE ANNUAL WELLNESS VISIT, SUBSEQUENT: Primary | ICD-10-CM

## 2023-12-01 DIAGNOSIS — R15.9 INCONTINENCE OF FECES, UNSPECIFIED FECAL INCONTINENCE TYPE: ICD-10-CM

## 2023-12-01 DIAGNOSIS — I50.32 CHRONIC DIASTOLIC (CONGESTIVE) HEART FAILURE: ICD-10-CM

## 2023-12-01 LAB
ALBUMIN SERPL-MCNC: 4.5 G/DL (ref 3.5–5.2)
ALBUMIN UR-MCNC: 2.3 MG/DL
ALBUMIN/GLOB SERPL: 1.6 G/DL
ALP SERPL-CCNC: 80 U/L (ref 39–117)
ALT SERPL W P-5'-P-CCNC: 38 U/L (ref 1–33)
ANION GAP SERPL CALCULATED.3IONS-SCNC: 13.6 MMOL/L (ref 5–15)
AST SERPL-CCNC: 33 U/L (ref 1–32)
BASOPHILS # BLD AUTO: 0.02 10*3/MM3 (ref 0–0.2)
BASOPHILS NFR BLD AUTO: 0.3 % (ref 0–1.5)
BILIRUB SERPL-MCNC: 0.4 MG/DL (ref 0–1.2)
BUN SERPL-MCNC: 18 MG/DL (ref 8–23)
BUN/CREAT SERPL: 25.7 (ref 7–25)
CALCIUM SPEC-SCNC: 10.1 MG/DL (ref 8.6–10.5)
CHLORIDE SERPL-SCNC: 99 MMOL/L (ref 98–107)
CHOLEST SERPL-MCNC: 165 MG/DL (ref 0–200)
CO2 SERPL-SCNC: 26.4 MMOL/L (ref 22–29)
CREAT SERPL-MCNC: 0.7 MG/DL (ref 0.57–1)
DEPRECATED RDW RBC AUTO: 37.4 FL (ref 37–54)
EGFRCR SERPLBLD CKD-EPI 2021: 92 ML/MIN/1.73
EOSINOPHIL # BLD AUTO: 0.08 10*3/MM3 (ref 0–0.4)
EOSINOPHIL NFR BLD AUTO: 1.2 % (ref 0.3–6.2)
ERYTHROCYTE [DISTWIDTH] IN BLOOD BY AUTOMATED COUNT: 11.9 % (ref 12.3–15.4)
GLOBULIN UR ELPH-MCNC: 2.9 GM/DL
GLUCOSE SERPL-MCNC: 182 MG/DL (ref 65–99)
HBA1C MFR BLD: 7.3 % (ref 4.8–5.6)
HCT VFR BLD AUTO: 38.9 % (ref 34–46.6)
HCV AB SER DONR QL: NORMAL
HDLC SERPL-MCNC: 71 MG/DL (ref 40–60)
HGB BLD-MCNC: 13.6 G/DL (ref 12–15.9)
IMM GRANULOCYTES # BLD AUTO: 0 10*3/MM3 (ref 0–0.05)
IMM GRANULOCYTES NFR BLD AUTO: 0 % (ref 0–0.5)
LDLC SERPL CALC-MCNC: 69 MG/DL (ref 0–100)
LDLC/HDLC SERPL: 0.91 {RATIO}
LYMPHOCYTES # BLD AUTO: 3.06 10*3/MM3 (ref 0.7–3.1)
LYMPHOCYTES NFR BLD AUTO: 46.3 % (ref 19.6–45.3)
MCH RBC QN AUTO: 30.2 PG (ref 26.6–33)
MCHC RBC AUTO-ENTMCNC: 35 G/DL (ref 31.5–35.7)
MCV RBC AUTO: 86.4 FL (ref 79–97)
MONOCYTES # BLD AUTO: 0.68 10*3/MM3 (ref 0.1–0.9)
MONOCYTES NFR BLD AUTO: 10.3 % (ref 5–12)
NEUTROPHILS NFR BLD AUTO: 2.77 10*3/MM3 (ref 1.7–7)
NEUTROPHILS NFR BLD AUTO: 41.9 % (ref 42.7–76)
NRBC BLD AUTO-RTO: 0 /100 WBC (ref 0–0.2)
PLATELET # BLD AUTO: 231 10*3/MM3 (ref 140–450)
PMV BLD AUTO: 12.2 FL (ref 6–12)
POTASSIUM SERPL-SCNC: 4.4 MMOL/L (ref 3.5–5.2)
PROT SERPL-MCNC: 7.4 G/DL (ref 6–8.5)
RBC # BLD AUTO: 4.5 10*6/MM3 (ref 3.77–5.28)
SODIUM SERPL-SCNC: 139 MMOL/L (ref 136–145)
TRIGL SERPL-MCNC: 148 MG/DL (ref 0–150)
TSH SERPL DL<=0.05 MIU/L-ACNC: 1.71 UIU/ML (ref 0.27–4.2)
VLDLC SERPL-MCNC: 25 MG/DL (ref 5–40)
WBC NRBC COR # BLD AUTO: 6.61 10*3/MM3 (ref 3.4–10.8)

## 2023-12-01 PROCEDURE — 85025 COMPLETE CBC W/AUTO DIFF WBC: CPT | Performed by: NURSE PRACTITIONER

## 2023-12-01 PROCEDURE — 80061 LIPID PANEL: CPT | Performed by: NURSE PRACTITIONER

## 2023-12-01 PROCEDURE — 82043 UR ALBUMIN QUANTITATIVE: CPT | Performed by: NURSE PRACTITIONER

## 2023-12-01 PROCEDURE — 84443 ASSAY THYROID STIM HORMONE: CPT | Performed by: NURSE PRACTITIONER

## 2023-12-01 PROCEDURE — 86803 HEPATITIS C AB TEST: CPT | Performed by: NURSE PRACTITIONER

## 2023-12-01 PROCEDURE — 83036 HEMOGLOBIN GLYCOSYLATED A1C: CPT | Performed by: NURSE PRACTITIONER

## 2023-12-01 PROCEDURE — 80053 COMPREHEN METABOLIC PANEL: CPT | Performed by: NURSE PRACTITIONER

## 2023-12-01 NOTE — ASSESSMENT & PLAN NOTE
Basic labs in clinic today. Encouraged routine dental and eye exams. Discussed age appropriate immunizations, screenings. Age appropriate handout provided, including information on nutrition and physical activity.

## 2023-12-01 NOTE — ASSESSMENT & PLAN NOTE
Well controlled, continue atenolol and nifedipine. She should monitor blood pressure at home and call or return to clinic with consistent elevations greater than 130/80. Labs in clinic today.

## 2023-12-01 NOTE — PROGRESS NOTES
The ABCs of the Annual Wellness Visit  Subsequent Medicare Wellness Visit    Subjective    Jeimy Estevez is a 73 y.o. female who presents for a Subsequent Medicare Wellness Visit.    The following portions of the patient's history were reviewed and   updated as appropriate: allergies, current medications, past family history, past medical history, past social history, past surgical history, and problem list.    Compared to one year ago, the patient feels her physical   health is better.    Compared to one year ago, the patient feels her mental   health is better.    Recent Hospitalizations:  She was not admitted to the hospital during the last year.       Current Medical Providers:  Patient Care Team:  Minoo Shirley APRN as PCP - General (Nurse Practitioner)  Leonel Triplett MD as Consulting Physician (Cardiology)    Outpatient Medications Prior to Visit   Medication Sig Dispense Refill    Accu-Chek FastClix Lancets misc USE TO CHECK BLOOD SUGAR ONCE A DAY      Accu-Chek SmartView test strip USE AS DIRECTED TO TEST FOUR TIMES DAILY AS NEEDED 400 each 1    atenolol (TENORMIN) 50 MG tablet Take 1 tablet by mouth Daily. 90 tablet 1    BD Pen Needle Kathy 2nd Gen 32G X 4 MM misc USE ONCE DAILY WITH INJECTIONS      Biotin 5 MG tablet dispersible biotin 5,000 mcg oral tablet,disintegrating dissolve  1 tablet by oral route daily   Active      furosemide (LASIX) 40 MG tablet Take 1 tablet by mouth Daily. May also take 1 tablet Daily As Needed (for leg swelling or shortness of breath). 180 tablet 3    metFORMIN (GLUCOPHAGE) 500 MG tablet Take 2 tablets by mouth 2 (Two) Times a Day.      NIFEdipine CC (ADALAT CC) 60 MG 24 hr tablet Take 1 tablet by mouth Daily. 90 tablet 3    Semaglutide,0.25 or 0.5MG/DOS, (Ozempic, 0.25 or 0.5 MG/DOSE,) 2 MG/1.5ML solution pen-injector Inject 0.5 mg under the skin into the appropriate area as directed 1 (One) Time Per Week.      simvastatin (ZOCOR) 40 MG tablet Take 1 tablet by mouth  "Daily. 90 tablet 3     No facility-administered medications prior to visit.       No opioid medication identified on active medication list. I have reviewed chart for other potential  high risk medication/s and harmful drug interactions in the elderly.        Aspirin is not on active medication list.  Aspirin use is not indicated based on review of current medical condition/s. Risk of harm outweighs potential benefits.  .    Patient Active Problem List   Diagnosis    Chronic diastolic (congestive) heart failure    Essential hypertension    Type 2 diabetes mellitus with hyperglycemia, without long-term current use of insulin    Medicare annual wellness visit, subsequent    Hyperlipidemia    Colon polyps    Incontinence of feces    Globus sensation    Altered bowel habits    Diverticulosis    Gastroesophageal reflux disease    Hemorrhoids    Oropharyngeal dysphagia    Esophageal dysphagia     Advance Care Planning   Advance Care Planning     Advance Directive is not on file.  ACP discussion was held with the patient during this visit. Patient has an advance directive (not in EMR), copy requested.     Objective    Vitals:    23 1022   BP: 120/63   Pulse: 69   Temp: 97 °F (36.1 °C)   TempSrc: Temporal   SpO2: 98%   Weight: 83.6 kg (184 lb 6.4 oz)     Estimated body mass index is 30.69 kg/m² as calculated from the following:    Height as of 23: 165.1 cm (65\").    Weight as of this encounter: 83.6 kg (184 lb 6.4 oz).           Does the patient have evidence of cognitive impairment? No          HEALTH RISK ASSESSMENT    Smoking Status:  Social History     Tobacco Use   Smoking Status Never    Passive exposure: Past   Smokeless Tobacco Never     Alcohol Consumption:  Social History     Substance and Sexual Activity   Alcohol Use Never     Fall Risk Screen:    STEADI Fall Risk Assessment was completed, and patient is at LOW risk for falls.Assessment completed on:2023    Depression Screenin/30/2023 "    10:56 AM   PHQ-2/PHQ-9 Depression Screening   Little Interest or Pleasure in Doing Things 0-->not at all   Feeling Down, Depressed or Hopeless 0-->not at all   PHQ-9: Brief Depression Severity Measure Score 0       Health Habits and Functional and Cognitive Screenin/30/2022    11:00 AM   Functional & Cognitive Status   Do you ever drive or ride in a car without wearing a seat belt? No       Age-appropriate Screening Schedule:  Refer to the list below for future screening recommendations based on patient's age, sex and/or medical conditions. Orders for these recommended tests are listed in the plan section. The patient has been provided with a written plan.    Health Maintenance   Topic Date Due    Pneumococcal Vaccine 65+ (1 - PCV) Never done    HEPATITIS C SCREENING  Never done    LIPID PANEL  11/10/2023    URINE MICROALBUMIN  11/10/2023    ZOSTER VACCINE (1 of 2) 2023 (Originally 3/4/2001)    COVID-19 Vaccine (1) 2023 (Originally 1950)    INFLUENZA VACCINE  2024 (Originally 2023)    TDAP/TD VACCINES (1 - Tdap) 2024 (Originally 3/4/1970)    DIABETIC EYE EXAM  2024    HEMOGLOBIN A1C  2024    BMI FOLLOWUP  2024    ANNUAL WELLNESS VISIT  2024    DIABETIC FOOT EXAM  2024    MAMMOGRAM  2025    DXA SCAN  2025    COLORECTAL CANCER SCREENING  07/10/2028                  CMS Preventative Services Quick Reference  Risk Factors Identified During Encounter  Immunizations Discussed/Encouraged: Influenza, Prevnar 20 (Pneumococcal 20-valent conjugate), and COVID19  The above risks/problems have been discussed with the patient.  Pertinent information has been shared with the patient in the After Visit Summary.  An After Visit Summary and PPPS were made available to the patient.    Follow Up:   Next Medicare Wellness visit to be scheduled in 1 year.       Additional E&M Note during same encounter follows:  Patient has multiple medical problems  which are significant and separately identifiable that require additional work above and beyond the Medicare Wellness Visit.      Chief Complaint  Medicare Wellness-subsequent (No concerns )    Subjective        HPI  Jeimy Estevez is also being seen today for     Fecal incontinence-  Persists. Scheduled with Dr. Walsh, referred by GI, to discuss sacral nerve root stimulator. Will still feel no urge to go, with little warning. May have noticed some improvement since starting fiber supplement.      HTN-  Continues atenolol and nifedipine. She does not check her blood pressure at home. Denies chest pain, headache, leg swelling.      HLD-  Continues statin, well tolerated. Denies leg cramping. Most recent LDL 69. Agreeable to repeat albs today.      CHF-  Patient continues Lasix and BB. Denies shortness of breath, weight gain, orthopnea. Scheduled for follow up with cardiology 3/2024.     DM2-  Continues metformin and Ozempic through [place]. States her glucose levels have been sporadic, ranging from the 120s-180s. She would like to have her A1c checked today. Most recent A1c 6.6. Diabetic eye exam: 2/2023. Diabetic foot exam: 6/2022. Urine microalbumin: 11/2022. Renal protection: Declines. Pneumonia vaccination: Declines.    Mammogram: 3/2023  DEXA: 3/2023  Colonoscopy: 2023  COVID19 vaccination: Declines  Shingles vaccination: Declines  Influenza vaccination: Declines         Objective   Vital Signs:  /63   Pulse 69   Temp 97 °F (36.1 °C) (Temporal)   Wt 83.6 kg (184 lb 6.4 oz)   SpO2 98%   BMI 30.69 kg/m²     Physical Exam  Constitutional:       Appearance: Normal appearance.   HENT:      Head: Normocephalic and atraumatic.      Nose: Nose normal.      Mouth/Throat:      Mouth: Mucous membranes are moist.      Pharynx: Oropharynx is clear.   Eyes:      Extraocular Movements: Extraocular movements intact.      Conjunctiva/sclera: Conjunctivae normal.      Pupils: Pupils are equal, round, and  reactive to light.   Cardiovascular:      Rate and Rhythm: Normal rate and regular rhythm.      Heart sounds: Normal heart sounds.   Pulmonary:      Effort: Pulmonary effort is normal.      Breath sounds: Normal breath sounds.   Musculoskeletal:        Feet:    Skin:     General: Skin is warm and dry.   Neurological:      General: No focal deficit present.      Mental Status: She is alert and oriented to person, place, and time.   Psychiatric:         Mood and Affect: Mood normal.         Behavior: Behavior normal.         Thought Content: Thought content normal.                         Assessment and Plan   Diagnoses and all orders for this visit:    1. Medicare annual wellness visit, subsequent (Primary)  Assessment & Plan:  Basic labs in clinic today. Encouraged routine dental and eye exams. Discussed age appropriate immunizations, screenings. Age appropriate handout provided, including information on nutrition and physical activity.      2. Type 2 diabetes mellitus with hyperglycemia, without long-term current use of insulin  Assessment & Plan:  Well controlled, continue Ozempic and metformin. Most recent HgbA1c 6.6. She should monitor blood glucose levels closely and call or return to clinic with consistent elevations or frequent lows. Repeat labs in clinic today. Diabetic eye exam: 2/2023. Diabetic foot exam: Today. Urine microalbumin: Today. Renal protection: Declines. Pneumonia vaccination: Declines.      Orders:  -     CBC & Differential  -     Comprehensive Metabolic Panel  -     Lipid Panel  -     TSH  -     MicroAlbumin, Urine, Random - Urine, Clean Catch  -     Hemoglobin A1c  -     Ambulatory Referral to Podiatry    3. Essential hypertension  Assessment & Plan:  Well controlled, continue atenolol and nifedipine. She should monitor blood pressure at home and call or return to clinic with consistent elevations greater than 130/80. Labs in clinic today.        4. Chronic diastolic (congestive) heart  failure  Assessment & Plan:  Continue Lasix and atenolol. Follow up with cardiology as scheduled.       5. Incontinence of feces, unspecified fecal incontinence type  Assessment & Plan:  Patient to continue fiber supplement and will discuss further with surgeon.      6. Hyperlipidemia, unspecified hyperlipidemia type  Assessment & Plan:  Well controlled, continue statin. Most recent LDL 69. Lipid panel with labs.        7. Screening for condition  Comments:  Hepatitis C screening with labs.  Orders:  -     Hepatitis C Antibody             Follow Up   Return in about 6 months (around 6/1/2024).  Patient was given instructions and counseling regarding her condition or for health maintenance advice. Please see specific information pulled into the AVS if appropriate.

## 2023-12-01 NOTE — ASSESSMENT & PLAN NOTE
Well controlled, continue Ozempic and metformin. Most recent HgbA1c 6.6. She should monitor blood glucose levels closely and call or return to clinic with consistent elevations or frequent lows. Repeat labs in clinic today. Diabetic eye exam: 2/2023. Diabetic foot exam: Today. Urine microalbumin: Today. Renal protection: Declines. Pneumonia vaccination: Declines.

## 2023-12-05 ENCOUNTER — OFFICE VISIT (OUTPATIENT)
Dept: SURGERY | Facility: CLINIC | Age: 73
End: 2023-12-05
Payer: MEDICARE

## 2023-12-05 ENCOUNTER — PREP FOR SURGERY (OUTPATIENT)
Dept: OTHER | Facility: HOSPITAL | Age: 73
End: 2023-12-05
Payer: MEDICARE

## 2023-12-05 VITALS — WEIGHT: 185 LBS | BODY MASS INDEX: 30.82 KG/M2 | TEMPERATURE: 98.4 F | HEIGHT: 65 IN

## 2023-12-05 DIAGNOSIS — R15.9 FULL INCONTINENCE OF FECES: Primary | ICD-10-CM

## 2023-12-05 PROCEDURE — 99203 OFFICE O/P NEW LOW 30 MIN: CPT | Performed by: SURGERY

## 2023-12-05 PROCEDURE — 1160F RVW MEDS BY RX/DR IN RCRD: CPT | Performed by: SURGERY

## 2023-12-05 PROCEDURE — 1159F MED LIST DOCD IN RCRD: CPT | Performed by: SURGERY

## 2023-12-05 RX ORDER — SODIUM CHLORIDE 0.9 % (FLUSH) 0.9 %
10 SYRINGE (ML) INJECTION EVERY 12 HOURS SCHEDULED
OUTPATIENT
Start: 2023-12-05

## 2023-12-05 RX ORDER — SODIUM CHLORIDE, SODIUM LACTATE, POTASSIUM CHLORIDE, CALCIUM CHLORIDE 600; 310; 30; 20 MG/100ML; MG/100ML; MG/100ML; MG/100ML
50 INJECTION, SOLUTION INTRAVENOUS CONTINUOUS
OUTPATIENT
Start: 2023-12-05

## 2023-12-05 RX ORDER — CEFAZOLIN SODIUM 2 G/100ML
2000 INJECTION, SOLUTION INTRAVENOUS ONCE
OUTPATIENT
Start: 2023-12-05 | End: 2023-12-05

## 2023-12-05 RX ORDER — SODIUM CHLORIDE 9 MG/ML
40 INJECTION, SOLUTION INTRAVENOUS AS NEEDED
OUTPATIENT
Start: 2023-12-05

## 2023-12-05 RX ORDER — SODIUM CHLORIDE 0.9 % (FLUSH) 0.9 %
10 SYRINGE (ML) INJECTION AS NEEDED
OUTPATIENT
Start: 2023-12-05

## 2023-12-05 NOTE — LETTER
December 11, 2023       No Recipients    Patient: Jeimy Estevez   YOB: 1950   Date of Visit: 12/5/2023     Dear EBONIE Jones:       Thank you for referring Jeimy Estevez to me for evaluation. Below are the relevant portions of my assessment and plan of care.    If you have questions, please do not hesitate to call me. I look forward to following Jeimy along with you.         Sincerely,        Edi Walsh MD        CC:   No Recipients    Edi Walsh MD  12/11/23 0738  Sign when Signing Visit  General Surgery/Colorectal Surgery Note    Patient Name:  Jeimy Estevez  YOB: 1950  [id number]    Referring Provider: Ronald Torres, *      Patient Care Team:  Minoo Shirley APRN as PCP - General (Nurse Practitioner)  Leonel Triplett MD as Consulting Physician (Cardiology)  Edi Walsh MD as Consulting Physician (General Surgery)    Chief complaint bowel accidents    Subjective.     History of present illness:    She has a history of fecal incontinence 2 times per week over the past year.  No history of the same.  She has decreased sensation that she has to have a bowel movement.  She has a history of hemorrhoid prolapse requiring manual reduction.  Previous colonoscopy July 2023 by Dr. Torres with diverticulosis.  No recent chest pain.  She has been on fiber with minimal improvement.  No history of anorectal trauma or surgery.  Previous vaginal delivery x 2 uncomplicated.  History of semaglutide use.      History:  Past Medical History:   Diagnosis Date   • CHF (congestive heart failure)    • Chronic diastolic (congestive) heart failure 11/01/2021   • Colon polyps    • Diabetes mellitus    • Diverticulosis    • Hemorrhoids    • Hyperlipidemia    • Hypertension    • Poor circulation     seems to have resolved       Past Surgical History:   Procedure Laterality Date   • APPENDECTOMY     • CARDIAC CATHETERIZATION     • CHOLECYSTECTOMY     •  COLONOSCOPY  2020    [facility]   • COLONOSCOPY N/A 7/10/2023    Procedure: COLONOSCOPY;  Surgeon: Ronald Torres MD;  Location: [facility] ENDOSCOPY;  Service: Gastroenterology;  Laterality: N/A;  diverticulosis   • ENDOSCOPY N/A 7/10/2023    Procedure: ESOPHAGOGASTRODUODENOSCOPY with biopsies, dilation with 15- 18 mm balloon;  Surgeon: Ronald Torres MD;  Location: [facility] ENDOSCOPY;  Service: Gastroenterology;  Laterality: N/A;  hiatal hernia, esophageal stricture   • TONSILLECTOMY     • TUBAL ABDOMINAL LIGATION         Family History   Problem Relation Age of Onset   • Heart attack Mother    • No Known Problems Father    • Diabetes Paternal Grandmother    • Colon cancer Neg Hx        Social History     Tobacco Use   • Smoking status: Never     Passive exposure: Past   • Smokeless tobacco: Never   Vaping Use   • Vaping Use: Never used   Substance Use Topics   • Alcohol use: Never   • Drug use: Never       Review of Systems  All systems were reviewed and negative except for:   Review of Systems   Constitutional: Negative for chills, fever and unexpected weight loss.   HENT: Negative for congestion, nosebleeds and voice change.    Eyes: Negative for blurred vision, double vision and discharge.   Respiratory: Negative for apnea, chest tightness and shortness of breath.    Cardiovascular: Negative for chest pain and leg swelling.   Gastrointestinal:        See HPI   Endocrine: Negative for cold intolerance and heat intolerance.   Genitourinary: Negative for dysuria, hematuria and urgency.   Musculoskeletal: Negative for back pain, joint swelling and neck pain.   Skin: Negative for color change and dry skin.   Neurological: Negative for dizziness and confusion.   Hematological: Negative for adenopathy.   Psychiatric/Behavioral: Negative for agitation and behavioral problems.     MEDS:  Prior to Admission medications    Medication Sig Start Date End Date Taking? Authorizing Provider   Accu-Chek FastClix Lancets  misc USE TO CHECK BLOOD SUGAR ONCE A DAY 9/6/22  Yes Samantha Gayle MD   Accu-Chek SmartView test strip USE AS DIRECTED TO TEST FOUR TIMES DAILY AS NEEDED 1/5/22  Yes Minoo Shirley APRN   atenolol (TENORMIN) 50 MG tablet Take 1 tablet by mouth Daily. 3/10/23  Yes Minoo Shirley APRN   BD Pen Needle Kathy 2nd Gen 32G X 4 MM misc USE ONCE DAILY WITH INJECTIONS 4/22/21  Yes Saamntha Gayle MD   Biotin 5 MG tablet dispersible biotin 5,000 mcg oral tablet,disintegrating dissolve  1 tablet by oral route daily   Active   Yes Samantha Gayle MD   furosemide (LASIX) 40 MG tablet Take 1 tablet by mouth Daily. May also take 1 tablet Daily As Needed (for leg swelling or shortness of breath). 8/21/23  Yes Coretta Gonzalez APRN   metFORMIN (GLUCOPHAGE) 500 MG tablet Take 2 tablets by mouth 2 (Two) Times a Day. 6/7/21  Yes Samantha Gayle MD   NIFEdipine CC (ADALAT CC) 60 MG 24 hr tablet Take 1 tablet by mouth Daily. 8/21/23  Yes Coretta Gonzalez APRN   Semaglutide,0.25 or 0.5MG/DOS, (Ozempic, 0.25 or 0.5 MG/DOSE,) 2 MG/1.5ML solution pen-injector Inject 0.5 mg under the skin into the appropriate area as directed 1 (One) Time Per Week.   Yes Samantha Gayle MD   simvastatin (ZOCOR) 40 MG tablet Take 1 tablet by mouth Daily. 8/21/23  Yes Coretta Gonzalez APRN        Allergies:  Patient has no known allergies.    Objective    Vital Signs        Physical Exam:     General Appearance:    Alert, cooperative, in no acute distress   Head:    Normocephalic, without obvious abnormality, atraumatic   Eyes:          Conjunctivae and sclerae normal, no icterus,     Ears:    Ears appear intact with no abnormalities noted   Throat:   No oral lesions, no thrush, oral mucosa moist   Neck:   No adenopathy, supple, trachea midline, no thyromegaly   Back:     No kyphosis present, no scoliosis present, no skin lesions,      erythema or scars, no tenderness to percussion or                   palpation,    "range of motion normal   Lungs:     Clear to auscultation,respirations regular, even and                  unlabored    Heart:    Regular rhythm and normal rate, normal S1 and S2, no            murmur, no gallop, no rub, no click   Chest Wall:    No abnormalities observed   Abdomen:     Normal bowel sounds, no masses, no organomegaly, soft        non-tender, non-distended, no guarding, no rebound                tenderness   Rectal:        Extremities:   Moves all extremities well, no edema, no cyanosis, no             redness   Pulses:   Pulses palpable and equal bilaterally   Skin:   No bleeding, bruising or rash   Lymph nodes:   No palpable adenopathy   Neurologic:   A/o x 4 with no deficits       Results Review:   {Results Review:15322::\"I reviewed the patient's new clinical results.\"    LABS/IMAGING:  Results for orders placed or performed in visit on 12/01/23   Comprehensive Metabolic Panel    Specimen: Arm, Left; Blood   Result Value Ref Range    Glucose 182 (H) 65 - 99 mg/dL    BUN 18 8 - 23 mg/dL    Creatinine 0.70 0.57 - 1.00 mg/dL    Sodium 139 136 - 145 mmol/L    Potassium 4.4 3.5 - 5.2 mmol/L    Chloride 99 98 - 107 mmol/L    CO2 26.4 22.0 - 29.0 mmol/L    Calcium 10.1 8.6 - 10.5 mg/dL    Total Protein 7.4 6.0 - 8.5 g/dL    Albumin 4.5 3.5 - 5.2 g/dL    ALT (SGPT) 38 (H) 1 - 33 U/L    AST (SGOT) 33 (H) 1 - 32 U/L    Alkaline Phosphatase 80 39 - 117 U/L    Total Bilirubin 0.4 0.0 - 1.2 mg/dL    Globulin 2.9 gm/dL    A/G Ratio 1.6 g/dL    BUN/Creatinine Ratio 25.7 (H) 7.0 - 25.0    Anion Gap 13.6 5.0 - 15.0 mmol/L    eGFR 92.0 >60.0 mL/min/1.73   Lipid Panel    Specimen: Arm, Left; Blood   Result Value Ref Range    Total Cholesterol 165 0 - 200 mg/dL    Triglycerides 148 0 - 150 mg/dL    HDL Cholesterol 71 (H) 40 - 60 mg/dL    LDL Cholesterol  69 0 - 100 mg/dL    VLDL Cholesterol 25 5 - 40 mg/dL    LDL/HDL Ratio 0.91    TSH    Specimen: Arm, Left; Blood   Result Value Ref Range    TSH 1.710 0.270 - 4.200 " uIU/mL   MicroAlbumin, Urine, Random - Urine, Clean Catch    Specimen: Urine, Clean Catch   Result Value Ref Range    Microalbumin, Urine 2.3 mg/dL   Hemoglobin A1c    Specimen: Arm, Left; Blood   Result Value Ref Range    Hemoglobin A1C 7.30 (H) 4.80 - 5.60 %   Hepatitis C Antibody    Specimen: Arm, Left; Blood   Result Value Ref Range    Hepatitis C Ab Non-Reactive Non-Reactive   CBC Auto Differential    Specimen: Arm, Left; Blood   Result Value Ref Range    WBC 6.61 3.40 - 10.80 10*3/mm3    RBC 4.50 3.77 - 5.28 10*6/mm3    Hemoglobin 13.6 12.0 - 15.9 g/dL    Hematocrit 38.9 34.0 - 46.6 %    MCV 86.4 79.0 - 97.0 fL    MCH 30.2 26.6 - 33.0 pg    MCHC 35.0 31.5 - 35.7 g/dL    RDW 11.9 (L) 12.3 - 15.4 %    RDW-SD 37.4 37.0 - 54.0 fl    MPV 12.2 (H) 6.0 - 12.0 fL    Platelets 231 140 - 450 10*3/mm3    Neutrophil % 41.9 (L) 42.7 - 76.0 %    Lymphocyte % 46.3 (H) 19.6 - 45.3 %    Monocyte % 10.3 5.0 - 12.0 %    Eosinophil % 1.2 0.3 - 6.2 %    Basophil % 0.3 0.0 - 1.5 %    Immature Grans % 0.0 0.0 - 0.5 %    Neutrophils, Absolute 2.77 1.70 - 7.00 10*3/mm3    Lymphocytes, Absolute 3.06 0.70 - 3.10 10*3/mm3    Monocytes, Absolute 0.68 0.10 - 0.90 10*3/mm3    Eosinophils, Absolute 0.08 0.00 - 0.40 10*3/mm3    Basophils, Absolute 0.02 0.00 - 0.20 10*3/mm3    Immature Grans, Absolute 0.00 0.00 - 0.05 10*3/mm3    nRBC 0.0 0.0 - 0.2 /100 WBC        Result Review:     Assessment & Plan    Fecal incontinence    Discussion with patient.  I reviewed her colonoscopy with the results mentioned above.  I recommended 1 week basic peripheral nerve evaluation for sacral neuromodulation.  I explained the procedure and recovery.  Benefits and alternatives discussed.  Risk procedure including risk of anesthesia, bleeding, infection, need for more testing discussed.  All questions answered.  She agrees with the plan.  She was instructed to use chlorhexidine the night before surgery.  Hold her semaglutide for 2 weeks prior to the procedure.   Thank you for the consult.              This document has been electronically signed by Edi Walsh MD  December 11, 2023 07:36 EST

## 2023-12-11 NOTE — PROGRESS NOTES
General Surgery/Colorectal Surgery Note    Patient Name:  Jeimy Estevez  YOB: 1950  [id number]    Referring Provider: Ronald Torres, *      Patient Care Team:  Minoo Shirley APRN as PCP - General (Nurse Practitioner)  Leonel Triplett MD as Consulting Physician (Cardiology)  Edi Walsh MD as Consulting Physician (General Surgery)    Chief complaint bowel accidents    Subjective .     History of present illness:    She has a history of fecal incontinence 2 times per week over the past year.  No history of the same.  She has decreased sensation that she has to have a bowel movement.  She has a history of hemorrhoid prolapse requiring manual reduction.  Previous colonoscopy July 2023 by Dr. Torres with diverticulosis.  No recent chest pain.  She has been on fiber with minimal improvement.  No history of anorectal trauma or surgery.  Previous vaginal delivery x 2 uncomplicated.  History of semaglutide use.      History:  Past Medical History:   Diagnosis Date    CHF (congestive heart failure)     Chronic diastolic (congestive) heart failure 11/01/2021    Colon polyps     Diabetes mellitus     Diverticulosis     Hemorrhoids     Hyperlipidemia     Hypertension     Poor circulation     seems to have resolved       Past Surgical History:   Procedure Laterality Date    APPENDECTOMY      CARDIAC CATHETERIZATION      CHOLECYSTECTOMY      COLONOSCOPY  2020    denzel    COLONOSCOPY N/A 7/10/2023    Procedure: COLONOSCOPY;  Surgeon: Ronald Torres MD;  Location: [facility] ENDOSCOPY;  Service: Gastroenterology;  Laterality: N/A;  diverticulosis    ENDOSCOPY N/A 7/10/2023    Procedure: ESOPHAGOGASTRODUODENOSCOPY with biopsies, dilation with 15- 18 mm balloon;  Surgeon: Ronald Torres MD;  Location: [facility] ENDOSCOPY;  Service: Gastroenterology;  Laterality: N/A;  hiatal hernia, esophageal stricture    TONSILLECTOMY      TUBAL ABDOMINAL LIGATION         Family History   Problem  Relation Age of Onset    Heart attack Mother     No Known Problems Father     Diabetes Paternal Grandmother     Colon cancer Neg Hx        Social History     Tobacco Use    Smoking status: Never     Passive exposure: Past    Smokeless tobacco: Never   Vaping Use    Vaping Use: Never used   Substance Use Topics    Alcohol use: Never    Drug use: Never       Review of Systems  All systems were reviewed and negative except for:   Review of Systems   Constitutional: Negative for chills, fever and unexpected weight loss.   HENT: Negative for congestion, nosebleeds and voice change.    Eyes: Negative for blurred vision, double vision and discharge.   Respiratory: Negative for apnea, chest tightness and shortness of breath.    Cardiovascular: Negative for chest pain and leg swelling.   Gastrointestinal:        See HPI   Endocrine: Negative for cold intolerance and heat intolerance.   Genitourinary: Negative for dysuria, hematuria and urgency.   Musculoskeletal: Negative for back pain, joint swelling and neck pain.   Skin: Negative for color change and dry skin.   Neurological: Negative for dizziness and confusion.   Hematological: Negative for adenopathy.   Psychiatric/Behavioral: Negative for agitation and behavioral problems.     MEDS:  Prior to Admission medications    Medication Sig Start Date End Date Taking? Authorizing Provider   Accu-Chek FastClix Lancets misc USE TO CHECK BLOOD SUGAR ONCE A DAY 9/6/22  Yes Samantha Gayle MD   Accu-[place] SmartView test strip USE AS DIRECTED TO TEST FOUR TIMES DAILY AS NEEDED 1/5/22  Yes Minoo Shirley APRN   atenolol (TENORMIN) 50 MG tablet Take 1 tablet by mouth Daily. 3/10/23  Yes Minoo Shirley APRN   BD Pen Needle Kathy 2nd Gen 32G X 4 MM misc USE ONCE DAILY WITH INJECTIONS 4/22/21  Yes Samantha Gayle MD   Biotin 5 MG tablet dispersible biotin 5,000 mcg oral tablet,disintegrating dissolve  1 tablet by oral route daily   Active   Yes Samantha Gayle MD    furosemide (LASIX) 40 MG tablet Take 1 tablet by mouth Daily. May also take 1 tablet Daily As Needed (for leg swelling or shortness of breath). 8/21/23  Yes Coretta Gonzalez APRN   metFORMIN (GLUCOPHAGE) 500 MG tablet Take 2 tablets by mouth 2 (Two) Times a Day. 6/7/21  Yes ProviderSamantha MD   NIFEdipine CC (ADALAT CC) 60 MG 24 hr tablet Take 1 tablet by mouth Daily. 8/21/23  Yes Coretta Gonzalez APRN   Semaglutide,0.25 or 0.5MG/DOS, (Ozempic, 0.25 or 0.5 MG/DOSE,) 2 MG/1.5ML solution pen-injector Inject 0.5 mg under the skin into the appropriate area as directed 1 (One) Time Per Week.   Yes ProviderSamantha MD   simvastatin (ZOCOR) 40 MG tablet Take 1 tablet by mouth Daily. 8/21/23  Yes Coretta Gonzalez APRN        Allergies:  Patient has no known allergies.    Objective     Vital Signs        Physical Exam:     General Appearance:    Alert, cooperative, in no acute distress   Head:    Normocephalic, without obvious abnormality, atraumatic   Eyes:          Conjunctivae and sclerae normal, no icterus,     Ears:    Ears appear intact with no abnormalities noted   Throat:   No oral lesions, no thrush, oral mucosa moist   Neck:   No adenopathy, supple, trachea midline, no thyromegaly   Back:     No kyphosis present, no scoliosis present, no skin lesions,      erythema or scars, no tenderness to percussion or                   palpation,   range of motion normal   Lungs:     Clear to auscultation,respirations regular, even and                  unlabored    Heart:    Regular rhythm and normal rate, normal S1 and S2, no            murmur, no gallop, no rub, no click   Chest Wall:    No abnormalities observed   Abdomen:     Normal bowel sounds, no masses, no organomegaly, soft        non-tender, non-distended, no guarding, no rebound                tenderness   Rectal:        Extremities:   Moves all extremities well, no edema, no cyanosis, no             redness   Pulses:   Pulses palpable and  "equal bilaterally   Skin:   No bleeding, bruising or rash   Lymph nodes:   No palpable adenopathy   Neurologic:   A/o x 4 with no deficits       Results Review:   {Results Review:[id number]::\"I reviewed the patient's new clinical results.\"    LABS/IMAGING:  Results for orders placed or performed in visit on 12/01/23   Comprehensive Metabolic Panel    Specimen: Arm, Left; Blood   Result Value Ref Range    Glucose 182 (H) 65 - 99 mg/dL    BUN 18 8 - 23 mg/dL    Creatinine 0.70 0.57 - 1.00 mg/dL    Sodium 139 136 - 145 mmol/L    Potassium 4.4 3.5 - 5.2 mmol/L    Chloride 99 98 - 107 mmol/L    CO2 26.4 22.0 - 29.0 mmol/L    Calcium 10.1 8.6 - 10.5 mg/dL    Total Protein 7.4 6.0 - 8.5 g/dL    Albumin 4.5 3.5 - 5.2 g/dL    ALT (SGPT) 38 (H) 1 - 33 U/L    AST (SGOT) 33 (H) 1 - 32 U/L    Alkaline Phosphatase 80 39 - 117 U/L    Total Bilirubin 0.4 0.0 - 1.2 mg/dL    Globulin 2.9 gm/dL    A/G Ratio 1.6 g/dL    BUN/Creatinine Ratio 25.7 (H) 7.0 - 25.0    Anion Gap 13.6 5.0 - 15.0 mmol/L    eGFR 92.0 >60.0 mL/min/1.73   Lipid Panel    Specimen: Arm, Left; Blood   Result Value Ref Range    Total Cholesterol 165 0 - 200 mg/dL    Triglycerides 148 0 - 150 mg/dL    HDL Cholesterol 71 (H) 40 - 60 mg/dL    LDL Cholesterol  69 0 - 100 mg/dL    VLDL Cholesterol 25 5 - 40 mg/dL    LDL/HDL Ratio 0.91    TSH    Specimen: Arm, Left; Blood   Result Value Ref Range    TSH 1.710 0.270 - 4.200 uIU/mL   MicroAlbumin, Urine, Random - Urine, Clean Catch    Specimen: Urine, Clean Catch   Result Value Ref Range    Microalbumin, Urine 2.3 mg/dL   Hemoglobin A1c    Specimen: Arm, Left; Blood   Result Value Ref Range    Hemoglobin A1C 7.30 (H) 4.80 - 5.60 %   Hepatitis C Antibody    Specimen: Arm, Left; Blood   Result Value Ref Range    Hepatitis C Ab Non-Reactive Non-Reactive   CBC Auto Differential    Specimen: Arm, Left; Blood   Result Value Ref Range    WBC 6.61 3.40 - 10.80 10*3/mm3    RBC 4.50 3.77 - 5.28 10*6/mm3    Hemoglobin 13.6 12.0 - 15.9 g/dL "    Hematocrit 38.9 34.0 - 46.6 %    MCV 86.4 79.0 - 97.0 fL    MCH 30.2 26.6 - 33.0 pg    MCHC 35.0 31.5 - 35.7 g/dL    RDW 11.9 (L) 12.3 - 15.4 %    RDW-SD 37.4 37.0 - 54.0 fl    MPV 12.2 (H) 6.0 - 12.0 fL    Platelets 231 140 - 450 10*3/mm3    Neutrophil % 41.9 (L) 42.7 - 76.0 %    Lymphocyte % 46.3 (H) 19.6 - 45.3 %    Monocyte % 10.3 5.0 - 12.0 %    Eosinophil % 1.2 0.3 - 6.2 %    Basophil % 0.3 0.0 - 1.5 %    Immature Grans % 0.0 0.0 - 0.5 %    Neutrophils, Absolute 2.77 1.70 - 7.00 10*3/mm3    Lymphocytes, Absolute 3.06 0.70 - 3.10 10*3/mm3    Monocytes, Absolute 0.68 0.10 - 0.90 10*3/mm3    Eosinophils, Absolute 0.08 0.00 - 0.40 10*3/mm3    Basophils, Absolute 0.02 0.00 - 0.20 10*3/mm3    Immature Grans, Absolute 0.00 0.00 - 0.05 10*3/mm3    nRBC 0.0 0.0 - 0.2 /100 WBC        Result Review :     Assessment & Plan     Fecal incontinence    Discussion with patient.  I reviewed her colonoscopy with the results mentioned above.  I recommended 1 week basic peripheral nerve evaluation for sacral neuromodulation.  I explained the procedure and recovery.  Benefits and alternatives discussed.  Risk procedure including risk of anesthesia, bleeding, infection, need for more testing discussed.  All questions answered.  She agrees with the plan.  She was instructed to use chlorhexidine the night before surgery.  Hold her semaglutide for 2 weeks prior to the procedure.  Thank you for the consult.              This document has been electronically signed by Edi Walsh MD  December 11, 2023 07:36 EST

## 2024-01-09 ENCOUNTER — TELEPHONE (OUTPATIENT)
Dept: SURGERY | Facility: CLINIC | Age: 74
End: 2024-01-09
Payer: MEDICARE

## 2024-01-09 NOTE — TELEPHONE ENCOUNTER
PATIENT WANTS TO CANCEL HER SURGERY. DR. ARDON PUT HER ON SOME FIBER PILLS TO HELP WITH HER SYMPTOMS. SHE SAID IT SEEMS TO BE HELPING HER. SHE THINKS SHE'S  SCHEDULED FOR SX ON FRIDAY 1/12/24 #  [phone]

## 2024-01-09 NOTE — TELEPHONE ENCOUNTER
Called pt to confirm she wants to cx without re-scheduling. Cx with Lizbeth in surgery scheduling.

## 2024-01-11 ENCOUNTER — OFFICE VISIT (OUTPATIENT)
Dept: PODIATRY | Facility: CLINIC | Age: 74
End: 2024-01-11
Payer: MEDICARE

## 2024-01-11 VITALS
DIASTOLIC BLOOD PRESSURE: 62 MMHG | WEIGHT: 185 LBS | BODY MASS INDEX: 30.82 KG/M2 | SYSTOLIC BLOOD PRESSURE: 97 MMHG | HEIGHT: 65 IN | HEART RATE: 76 BPM | OXYGEN SATURATION: 92 % | TEMPERATURE: 97.5 F

## 2024-01-11 DIAGNOSIS — M20.12 VALGUS DEFORMITY OF BOTH GREAT TOES: ICD-10-CM

## 2024-01-11 DIAGNOSIS — M20.11 VALGUS DEFORMITY OF BOTH GREAT TOES: ICD-10-CM

## 2024-01-11 DIAGNOSIS — S98.131A AMPUTATION OF TOE OF RIGHT FOOT: ICD-10-CM

## 2024-01-11 DIAGNOSIS — E11.8 DM FEET: ICD-10-CM

## 2024-01-11 DIAGNOSIS — G62.9 NEUROPATHY: ICD-10-CM

## 2024-01-11 DIAGNOSIS — E11.42 TYPE 2 DIABETES MELLITUS WITH POLYNEUROPATHY: Primary | ICD-10-CM

## 2024-01-11 NOTE — LETTER
January 11, 2024       No Recipients    Patient: Jeimy Estevez   YOB: 1950   Date of Visit: 1/11/2024       Dear EBONIE Jones:    Thank you for referring Jeimy Estevez to me for evaluation. Below are the relevant portions of my assessment and plan of care.    Encounter Diagnosis and Orders:  Diagnoses and all orders for this visit:    1. Type 2 diabetes mellitus with polyneuropathy (Primary)    2. Neuropathy    3. DM feet    4. Valgus deformity of both great toes    5. Amputation of toe of right foot        If you have questions, please do not hesitate to call me. I look forward to following Jeimy along with you.         Sincerely,        Bryce Santiago DPM        CC:   No Recipients

## 2024-01-11 NOTE — PROGRESS NOTES
[facility] - PODIATRY    Today's Date: 01/11/24    Patient Name: Jeimy Estevez  MRN: [id number]  CSN: [id number]  PCP: Minoo Shirley APRN, Last PCP Visit:  12/1/2023  Referring Provider: Minoo Shirley APRN    SUBJECTIVE     Chief Complaint   Patient presents with    Left Foot - Follow-up, Diabetes    Right Foot - Follow-up, Diabetes     HPI: Jeimy Estevez, a 73 y.o.female, presents to clinic for a diabetic foot evaluation.    New, Established, New Problem:  new    Onset: Insidious    Nature:  NIDDM    Patient controlling diabetes via:  oral meds and weekly    Patient states there last blood glucose was:  143    Patient denies any fevers, chills, nausea, vomiting, shortness of breath, nor any other constitutional signs nor symptoms.    No other pedal complaints at this time.    Previous Right 5th toe amputation from infection.    Past Medical History:   Diagnosis Date    CHF (congestive heart failure)     Chronic diastolic (congestive) heart failure 11/01/2021    Colon polyps     Diabetes mellitus     Diverticulosis     Hemorrhoids     Hyperlipidemia     Hypertension     Poor circulation     seems to have resolved     Past Surgical History:   Procedure Laterality Date    APPENDECTOMY      CARDIAC CATHETERIZATION      CHOLECYSTECTOMY      COLONOSCOPY  2020    denzel    COLONOSCOPY N/A 7/10/2023    Procedure: COLONOSCOPY;  Surgeon: Ronald Torres MD;  Location: [facility] ENDOSCOPY;  Service: Gastroenterology;  Laterality: N/A;  diverticulosis    ENDOSCOPY N/A 7/10/2023    Procedure: ESOPHAGOGASTRODUODENOSCOPY with biopsies, dilation with 15- 18 mm balloon;  Surgeon: Ronald Torres MD;  Location: [facility] ENDOSCOPY;  Service: Gastroenterology;  Laterality: N/A;  hiatal hernia, esophageal stricture    TONSILLECTOMY      TUBAL ABDOMINAL LIGATION       Family History   Problem Relation Age of Onset    Heart attack Mother     No Known Problems Father     Diabetes Paternal Grandmother      Colon cancer Neg Hx      Social History     Socioeconomic History    Marital status:    Tobacco Use    Smoking status: Never     Passive exposure: Past    Smokeless tobacco: Never   Vaping Use    Vaping Use: Never used   Substance and Sexual Activity    Alcohol use: Never    Drug use: Never    Sexual activity: Defer     No Known Allergies  Current Outpatient Medications   Medication Sig Dispense Refill    Accu-Chek FastClix Lancets misc USE TO CHECK BLOOD SUGAR ONCE A DAY      Accu-Chek SmartView test strip USE AS DIRECTED TO TEST FOUR TIMES DAILY AS NEEDED 400 each 1    atenolol (TENORMIN) 50 MG tablet Take 1 tablet by mouth Daily. 90 tablet 1    BD Pen Needle Kathy 2nd Gen 32G X 4 MM misc USE ONCE DAILY WITH INJECTIONS      Biotin 5 MG tablet dispersible biotin 5,000 mcg oral tablet,disintegrating dissolve  1 tablet by oral route daily   Active      furosemide (LASIX) 40 MG tablet Take 1 tablet by mouth Daily. May also take 1 tablet Daily As Needed (for leg swelling or shortness of breath). 180 tablet 3    metFORMIN (GLUCOPHAGE) 500 MG tablet Take 2 tablets by mouth 2 (Two) Times a Day.      NIFEdipine CC (ADALAT CC) 60 MG 24 hr tablet Take 1 tablet by mouth Daily. 90 tablet 3    Semaglutide,0.25 or 0.5MG/DOS, (Ozempic, 0.25 or 0.5 MG/DOSE,) 2 MG/1.5ML solution pen-injector Inject 0.5 mg under the skin into the appropriate area as directed 1 (One) Time Per Week.      simvastatin (ZOCOR) 40 MG tablet Take 1 tablet by mouth Daily. 90 tablet 3     No current facility-administered medications for this visit.     Review of Systems   Constitutional: Negative.    Neurological:  Positive for numbness.   All other systems reviewed and are negative.      OBJECTIVE     Vitals:    01/11/24 1003   BP: 97/62   Pulse: 76   Temp: 97.5 °F (36.4 °C)   SpO2: 92%       Body mass index is 30.79 kg/m².    Lab Results   Component Value Date    HGBA1C 7.30 (H) 12/01/2023       Lab Results   Component Value Date    GLUCOSE 182  (H) 12/01/2023    CALCIUM 10.1 12/01/2023     12/01/2023    K 4.4 12/01/2023    CO2 26.4 12/01/2023    CL 99 12/01/2023    BUN 18 12/01/2023    CREATININE 0.70 12/01/2023    EGFRIFNONA 87 11/29/2021    BCR 25.7 (H) 12/01/2023    ANIONGAP 13.6 12/01/2023       Patient seen in no apparent distress.      PHYSICAL EXAM:     Foot/Ankle Exam    GENERAL  Diabetic foot exam performed    Appearance:  appears stated age and elderly  Orientation:  AAOx3  Affect:  appropriate  Gait:  unimpaired  Assistance:  independent  Right shoe gear: casual shoe  Left shoe gear: casual shoe    VASCULAR     Right Foot Vascularity   Normal vascular exam    Dorsalis pedis:  2+  Posterior tibial:  2+  Skin temperature:  warm  Edema grading:  None  CFT:  < 3 seconds  Pedal hair growth:  Present  Varicosities:  none     Left Foot Vascularity   Normal vascular exam    Dorsalis pedis:  2+  Posterior tibial:  2+  Skin temperature:  warm  Edema grading:  None  CFT:  < 3 seconds  Pedal hair growth:  Present  Varicosities:  none     NEUROLOGIC     Right Foot Neurologic   Light touch sensation: diminished  Vibratory sensation: diminished  Hot/Cold sensation: diminished  Protective Sensation using [place] Monofilament:   Sites intact: 6  Sites tested: 10     Left Foot Neurologic   Light touch sensation: diminished  Vibratory sensation: diminished  Hot/Cold sensation:  diminished  Protective Sensation using [place] Monofilament:   Sites intact: 6  Sites tested: 10    MUSCULOSKELETAL     Right Foot Musculoskeletal    Amputation   Toes amputated: fifth toe  Hallux valgus: Yes       Left Foot Musculoskeletal   Hallux valgus: Yes      MUSCLE STRENGTH     Right Foot Muscle Strength   Foot dorsiflexion:  4  Foot plantar flexion:  4  Foot inversion:  4  Foot eversion:  4     Left Foot Muscle Strength   Foot dorsiflexion:  4  Foot plantar flexion:  4  Foot inversion:  4  Foot eversion:  4    RANGE OF MOTION     Right Foot Range of Motion    Foot and ankle ROM within normal limits       Left Foot Range of Motion   Foot and ankle ROM within normal limits      DERMATOLOGIC      Right Foot Dermatologic   Skin  Right foot skin is intact.      Left Foot Dermatologic   Skin  Left foot skin is intact.     Diabetic Foot Exam Performed and Monofilament Test Performed    ASSESSMENT/PLAN     Diagnoses and all orders for this visit:    1. Type 2 diabetes mellitus with polyneuropathy (Primary)    2. Neuropathy    3. DM feet    4. Valgus deformity of both great toes    5. Amputation of toe of right foot        Comprehensive lower extremity examination and evaluation was performed.    Discussed findings and treatment plan including risks, benefits, and treatment options with patient in detail. Patient agreed with treatment plan.    Medications and allergies reviewed.  Reviewed available blood glucose and HgB A1C lab values along with other pertinent labs.  These were discussed with the patient as to their importance of diabetic maintenance.    Diabetic foot exam performed and documented this date, compliant with CQM required standards. Detail of findings as noted in physical exam.  Lower extremity Neurologic exam for diabetic patient performed and documented this date, compliant with PQRS required standards. Detail of findings as noted in physical exam.  Advised patient importance of good routine lower extremity hygiene. Advised patient importance of evaluating for intact skin and pain free nail borders.  Advised patient to use mirror to evaluate plantar/ soles of feet for better visualization. Advised patient monitor and phone office to be seen if any cracking to skin, open lesions, painful nail borders or if nails become elongated prior to next visit. Advised patient importance of daily cleansing of lower extremities, followed by good skin cream to maintain normal hydration of skin. Also advised patient importance of close daily monitoring of blood sugar. Advised  to regulate diet and medications to maintain control of blood sugar in optimal range. Contact primary care provider if difficulties maintaining blood sugar levels.  Advised Patient of presence of Diabetes Mellitus condition.  Advised Patient risk of progression and worsening or improvement, then return of condition.  Will monitor condition for any change in future. Treat with most appropriate treatment pending status of condition.  Counseled and advised patient extensively on nature and ramifications of diabetes. Standard instructions given to patient for good diabetic foot care and maintenance. Advised importance of careful monitoring to avoid break down and complications secondary to diabetes. Advised patient importance of strict maintenance of blood sugar control. Advised patient of possible ominous results from neglect of condition, i.e.: amputation/ loss of digits, feet and legs, or even death.  Patient states understands counseling, will monitor closely, continue good hygiene and routine diabetic foot care. Patient will contact office is questions or problems.      An After Visit Summary was printed and given to the patient at discharge, including (if requested) any available informative/educational handouts regarding diagnosis, treatment, or medications. All questions were answered to patient/family satisfaction. Should symptoms fail to improve or worsen they agree to call or return to clinic or to go to the Emergency Department. Discussed the importance of following up with any needed screening tests/labs/specialist appointments and any requested follow-up recommended by me today. Importance of maintaining follow-up discussed and patient accepts that missed appointments can delay diagnosis and potentially lead to worsening of conditions.    Return in about 1 year (around 1/11/2025) for DFE., or sooner if acute issues arise.    This document has been electronically signed by Bryce Sanitago DPM on January  11, 2024 10:31 EST

## 2024-02-02 ENCOUNTER — TRANSCRIBE ORDERS (OUTPATIENT)
Dept: ADMINISTRATIVE | Facility: HOSPITAL | Age: 74
End: 2024-02-02
Payer: MEDICARE

## 2024-02-02 DIAGNOSIS — Z12.31 SCREENING MAMMOGRAM FOR BREAST CANCER: Primary | ICD-10-CM

## 2024-02-22 RX ORDER — ATENOLOL 50 MG/1
50 TABLET ORAL DAILY
Qty: 90 TABLET | Refills: 1 | Status: SHIPPED | OUTPATIENT
Start: 2024-02-22

## 2024-03-04 DIAGNOSIS — I50.32 CHRONIC DIASTOLIC (CONGESTIVE) HEART FAILURE: ICD-10-CM

## 2024-03-04 RX ORDER — ATENOLOL 50 MG/1
50 TABLET ORAL DAILY
Qty: 90 TABLET | Refills: 1 | OUTPATIENT
Start: 2024-03-04

## 2024-03-04 RX ORDER — FUROSEMIDE 40 MG/1
TABLET ORAL
Qty: 180 TABLET | Refills: 3 | Status: SHIPPED | OUTPATIENT
Start: 2024-03-04 | End: 2024-03-06

## 2024-03-06 ENCOUNTER — OFFICE VISIT (OUTPATIENT)
Dept: CARDIOLOGY | Facility: CLINIC | Age: 74
End: 2024-03-06
Payer: MEDICARE

## 2024-03-06 VITALS
WEIGHT: 184.6 LBS | SYSTOLIC BLOOD PRESSURE: 112 MMHG | DIASTOLIC BLOOD PRESSURE: 48 MMHG | HEIGHT: 65 IN | BODY MASS INDEX: 30.75 KG/M2 | HEART RATE: 74 BPM

## 2024-03-06 DIAGNOSIS — I10 ESSENTIAL HYPERTENSION: Primary | ICD-10-CM

## 2024-03-06 DIAGNOSIS — I50.32 CHRONIC DIASTOLIC (CONGESTIVE) HEART FAILURE: ICD-10-CM

## 2024-03-06 RX ORDER — FUROSEMIDE 40 MG/1
40 TABLET ORAL DAILY
Start: 2024-03-06

## 2024-03-06 NOTE — PROGRESS NOTES
Chief Complaint  Congestive Heart Failure, Hypertension, and Follow-up    Subjective    Patient doing well symptomatically denies any issues with breathing ability or increased weight gain or edema issues  Past Medical History:   Diagnosis Date    CHF (congestive heart failure)     Chronic diastolic (congestive) heart failure 11/01/2021    Colon polyps     Diabetes mellitus     Diverticulosis     Hemorrhoids     Hyperlipidemia     Hypertension     Poor circulation     seems to have resolved         Current Outpatient Medications:     Accu-Chek FastClix Lancets misc, USE TO CHECK BLOOD SUGAR ONCE A DAY, Disp: , Rfl:     atenolol (TENORMIN) 50 MG tablet, TAKE 1 TABLET BY MOUTH DAILY, Disp: 90 tablet, Rfl: 1    BD Pen Needle Kathy 2nd Gen 32G X 4 MM misc, USE ONCE DAILY WITH INJECTIONS, Disp: , Rfl:     Biotin 5 MG tablet dispersible, biotin 5,000 mcg oral tablet,disintegrating dissolve  1 tablet by oral route daily   Active, Disp: , Rfl:     furosemide (LASIX) 40 MG tablet, Take 1 tablet by mouth Daily., Disp: , Rfl:     metFORMIN (GLUCOPHAGE) 500 MG tablet, Take 2 tablets by mouth 2 (Two) Times a Day., Disp: , Rfl:     NIFEdipine CC (ADALAT CC) 60 MG 24 hr tablet, TAKE 1 TABLET BY MOUTH EVERY DAY, Disp: 90 tablet, Rfl: 3    Semaglutide,0.25 or 0.5MG/DOS, (Ozempic, 0.25 or 0.5 MG/DOSE,) 2 MG/1.5ML solution pen-injector, Inject 0.5 mg under the skin into the appropriate area as directed 1 (One) Time Per Week., Disp: , Rfl:     simvastatin (ZOCOR) 40 MG tablet, Take 1 tablet by mouth Daily., Disp: 90 tablet, Rfl: 3    Accu-Chek SmartView test strip, USE AS DIRECTED TO TEST FOUR TIMES DAILY AS NEEDED, Disp: 400 each, Rfl: 1    Medications Discontinued During This Encounter   Medication Reason    furosemide (LASIX) 40 MG tablet      No Known Allergies     Social History     Tobacco Use    Smoking status: Never     Passive exposure: Past    Smokeless tobacco: Never   Vaping Use    Vaping status: Never Used   Substance Use  "Topics    Alcohol use: Never    Drug use: Never       Family History   Problem Relation Age of Onset    Heart attack Mother     No Known Problems Father     Diabetes Paternal Grandmother     Colon cancer Neg Hx         Objective     /48 (BP Location: Left arm)   Pulse 74   Ht 165.1 cm (65\")   Wt 83.7 kg (184 lb 9.6 oz)   BMI 30.72 kg/m²       Physical Exam    General Appearance:   no acute distress  Alert and oriented x3  HENT:   lips not cyanotic  Atraumatic  Neck:  No jvd   supple  Respiratory:  no respiratory distress  normal breath sounds  no rales  Cardiovascular:  Regular rate and rhythm  no S3, no S4   no murmur  no rub  Extremities  No cyanosis  lower extremity edema trace  Skin:   warm, dry  No rashes      Result Review :     No results found for: \"PROBNP\"  CMP          6/12/2023    10:27 12/1/2023    11:30   CMP   Glucose 168  182    BUN 18  18    Creatinine 0.76  0.70    EGFR 83.4  92.0    Sodium 140  139    Potassium 4.0  4.4    Chloride 104  99    Calcium 10.1  10.1    Total Protein 7.7  7.4    Albumin 4.8  4.5    Globulin 2.9  2.9    Total Bilirubin 0.4  0.4    Alkaline Phosphatase 85  80    AST (SGOT) 23  33    ALT (SGPT) 29  38    Albumin/Globulin Ratio 1.7  1.6    BUN/Creatinine Ratio 23.7  25.7    Anion Gap 11.0  13.6      CBC w/diff          6/12/2023    10:27 12/1/2023    11:30   CBC w/Diff   WBC 8.70  6.61    RBC 5.17  4.50    Hemoglobin 15.2  13.6    Hematocrit 44.1  38.9    MCV 85.3  86.4    MCH 29.4  30.2    MCHC 34.5  35.0    RDW 12.0  11.9    Platelets 242  231    Neutrophil Rel % 57.2  41.9    Immature Granulocyte Rel % 0.2  0.0    Lymphocyte Rel % 32.9  46.3    Monocyte Rel % 8.6  10.3    Eosinophil Rel % 0.8  1.2    Basophil Rel % 0.3  0.3       Lab Results   Component Value Date    TSH 1.710 12/01/2023      Lab Results   Component Value Date    FREET4 1.2 07/21/2020      No results found for: \"DDIMERQUANT\"  No results found for: \"MG\"   No results found for: \"DIGOXIN\"   No " "results found for: \"TROPONINT\"        Lipid Panel          12/1/2023    11:30   Lipid Panel   Total Cholesterol 165    Triglycerides 148    HDL Cholesterol 71    VLDL Cholesterol 25    LDL Cholesterol  69    LDL/HDL Ratio 0.91      No results found for: \"POCTROP\"                   Diagnoses and all orders for this visit:    1. Essential hypertension (Primary)  Assessment & Plan:  Well-controlled continue with nifedipine 60 mg p.o. daily and atenolol 50 mg daily dosing       2. Chronic diastolic (congestive) heart failure  Assessment & Plan:  Stable symptomatically as well as from a volume standpoint on exam today she is down to taking Lasix just once a day did  on keeping a daily weight log and can take extra Lasix dose for more than 5 pound weight during the         Orders:  -     furosemide (LASIX) 40 MG tablet; Take 1 tablet by mouth Daily.            Follow Up     Return in about 6 months (around 9/6/2024) for Follow with Coretta Gonzalez.          Patient was given instructions and counseling regarding her condition or for health maintenance advice. Please see specific information pulled into the AVS if appropriate.       "

## 2024-03-06 NOTE — ASSESSMENT & PLAN NOTE
Stable symptomatically as well as from a volume standpoint on exam today she is down to taking Lasix just once a day did  on keeping a daily weight log and can take extra Lasix dose for more than 5 pound weight during the

## 2024-05-21 ENCOUNTER — OFFICE VISIT (OUTPATIENT)
Dept: GASTROENTEROLOGY | Facility: CLINIC | Age: 74
End: 2024-05-21
Payer: MEDICARE

## 2024-05-21 VITALS
OXYGEN SATURATION: 100 % | HEIGHT: 65 IN | BODY MASS INDEX: 30.49 KG/M2 | DIASTOLIC BLOOD PRESSURE: 58 MMHG | HEART RATE: 70 BPM | SYSTOLIC BLOOD PRESSURE: 101 MMHG | WEIGHT: 183 LBS

## 2024-05-21 DIAGNOSIS — K21.9 GASTROESOPHAGEAL REFLUX DISEASE WITHOUT ESOPHAGITIS: ICD-10-CM

## 2024-05-21 DIAGNOSIS — R15.9 INCONTINENCE OF FECES, UNSPECIFIED FECAL INCONTINENCE TYPE: Primary | ICD-10-CM

## 2024-05-21 PROCEDURE — 3078F DIAST BP <80 MM HG: CPT | Performed by: INTERNAL MEDICINE

## 2024-05-21 PROCEDURE — 3074F SYST BP LT 130 MM HG: CPT | Performed by: INTERNAL MEDICINE

## 2024-05-21 PROCEDURE — 1160F RVW MEDS BY RX/DR IN RCRD: CPT | Performed by: INTERNAL MEDICINE

## 2024-05-21 PROCEDURE — 1159F MED LIST DOCD IN RCRD: CPT | Performed by: INTERNAL MEDICINE

## 2024-05-21 PROCEDURE — 99213 OFFICE O/P EST LOW 20 MIN: CPT | Performed by: INTERNAL MEDICINE

## 2024-05-21 NOTE — PROGRESS NOTES
Chief Complaint    Jeimy Estevez is a 74 y.o. female who presents to [facility] for returns in follow-up for a history of fecal incontinence and esophageal stricture.  She had EGD and colonoscopy from July 2023 and dilation of esophageal stricture to 18 mm  balloon improved her dysphagia symptoms fairly well.  She also was referred to see colorectal surgery for consideration of neurostimulator device.  She was seen by Dr. Walsh but elected to defer her device placement.  She on average has 1-2 episodes of fecal incontinence per week.  She did recently start using fiber and thinks this has slightly helped.  She denies any new complaints.  She does not take any reflux medications currently.    Result Review :     The following data was reviewed by: Ronald Torres MD on 05/21/2024:             Past Medical History:   Diagnosis Date    CHF (congestive heart failure)     Chronic diastolic (congestive) heart failure 11/01/2021    Colon polyps     Diabetes mellitus     Diverticulosis     Hemorrhoids     Hyperlipidemia     Hypertension     Poor circulation     seems to have resolved       Past Surgical History:   Procedure Laterality Date    APPENDECTOMY      CARDIAC CATHETERIZATION      CHOLECYSTECTOMY      COLONOSCOPY  2020    [facility]    COLONOSCOPY N/A 7/10/2023    Procedure: COLONOSCOPY;  Surgeon: Ronald Torres MD;  Location: [facility] ENDOSCOPY;  Service: Gastroenterology;  Laterality: N/A;  diverticulosis    ENDOSCOPY N/A 7/10/2023    Procedure: ESOPHAGOGASTRODUODENOSCOPY with biopsies, dilation with 15- 18 mm balloon;  Surgeon: Ronald Torres MD;  Location: [facility] ENDOSCOPY;  Service: Gastroenterology;  Laterality: N/A;  hiatal hernia, esophageal stricture    TONSILLECTOMY      TUBAL ABDOMINAL LIGATION         Social History     Social History Narrative    Not on file       Objective     Vital Signs:   /58 (BP Location: Right arm, Patient Position:  "Sitting, Cuff Size: Adult)   Pulse 70   Ht 165.1 cm (65\")   Wt 83 kg (183 lb)   SpO2 100%   BMI 30.45 kg/m²     Body mass index is 30.45 kg/m².    Physical Exam            Assessment and Plan    Diagnoses and all orders for this visit:    1. Incontinence of feces, unspecified fecal incontinence type (Primary)    2. Gastroesophageal reflux disease without esophagitis    Overall the patient's symptoms are fairly stable.  She will adjust her daily use of fiber to see if she can decrease her episodes of fecal incontinence.  If her fecal incontinence worsens she will return to Dr. Walsh's office to consider sacral nerve root stimulator once again.  For her dysphagia symptoms she will continue her current medications and return if her symptoms worsen to the point of needing repeat dilation.  She will follow-up with me as needed.              Follow Up   No follow-ups on file.  Patient was given instructions and counseling regarding her condition or for health maintenance advice. Please see specific information pulled into the AVS if appropriate.     "

## 2024-05-22 RX ORDER — SIMVASTATIN 40 MG
40 TABLET ORAL DAILY
Qty: 90 TABLET | Refills: 3 | Status: SHIPPED | OUTPATIENT
Start: 2024-05-22

## 2024-07-29 ENCOUNTER — OFFICE VISIT (OUTPATIENT)
Dept: INTERNAL MEDICINE | Facility: CLINIC | Age: 74
End: 2024-07-29
Payer: MEDICARE

## 2024-07-29 VITALS
TEMPERATURE: 97.1 F | DIASTOLIC BLOOD PRESSURE: 78 MMHG | BODY MASS INDEX: 30.49 KG/M2 | HEIGHT: 65 IN | SYSTOLIC BLOOD PRESSURE: 126 MMHG | OXYGEN SATURATION: 97 % | WEIGHT: 183 LBS | HEART RATE: 84 BPM

## 2024-07-29 DIAGNOSIS — R15.9 INCONTINENCE OF FECES, UNSPECIFIED FECAL INCONTINENCE TYPE: ICD-10-CM

## 2024-07-29 DIAGNOSIS — E55.9 VITAMIN D DEFICIENCY: ICD-10-CM

## 2024-07-29 DIAGNOSIS — E11.65 TYPE 2 DIABETES MELLITUS WITH HYPERGLYCEMIA, WITHOUT LONG-TERM CURRENT USE OF INSULIN: Primary | ICD-10-CM

## 2024-07-29 DIAGNOSIS — I10 ESSENTIAL HYPERTENSION: ICD-10-CM

## 2024-07-29 DIAGNOSIS — I50.32 CHRONIC DIASTOLIC (CONGESTIVE) HEART FAILURE: ICD-10-CM

## 2024-07-29 DIAGNOSIS — E78.5 HYPERLIPIDEMIA, UNSPECIFIED HYPERLIPIDEMIA TYPE: ICD-10-CM

## 2024-07-29 DIAGNOSIS — Z12.31 VISIT FOR SCREENING MAMMOGRAM: ICD-10-CM

## 2024-07-29 DIAGNOSIS — R53.83 FATIGUE, UNSPECIFIED TYPE: ICD-10-CM

## 2024-07-29 LAB
25(OH)D3 SERPL-MCNC: 32.2 NG/ML (ref 30–100)
ALBUMIN SERPL-MCNC: 4.3 G/DL (ref 3.5–5.2)
ALBUMIN/GLOB SERPL: 1.2 G/DL
ALP SERPL-CCNC: 79 U/L (ref 39–117)
ALT SERPL W P-5'-P-CCNC: 25 U/L (ref 1–33)
ANION GAP SERPL CALCULATED.3IONS-SCNC: 14.7 MMOL/L (ref 5–15)
AST SERPL-CCNC: 28 U/L (ref 1–32)
BASOPHILS # BLD AUTO: 0.03 10*3/MM3 (ref 0–0.2)
BASOPHILS NFR BLD AUTO: 0.4 % (ref 0–1.5)
BILIRUB SERPL-MCNC: 0.5 MG/DL (ref 0–1.2)
BUN SERPL-MCNC: 15 MG/DL (ref 8–23)
BUN/CREAT SERPL: 17.6 (ref 7–25)
CALCIUM SPEC-SCNC: 10 MG/DL (ref 8.6–10.5)
CHLORIDE SERPL-SCNC: 99 MMOL/L (ref 98–107)
CHOLEST SERPL-MCNC: 137 MG/DL (ref 0–200)
CO2 SERPL-SCNC: 22.3 MMOL/L (ref 22–29)
CREAT SERPL-MCNC: 0.85 MG/DL (ref 0.57–1)
DEPRECATED RDW RBC AUTO: 38.7 FL (ref 37–54)
EGFRCR SERPLBLD CKD-EPI 2021: 72.4 ML/MIN/1.73
EOSINOPHIL # BLD AUTO: 0.14 10*3/MM3 (ref 0–0.4)
EOSINOPHIL NFR BLD AUTO: 2 % (ref 0.3–6.2)
ERYTHROCYTE [DISTWIDTH] IN BLOOD BY AUTOMATED COUNT: 12.1 % (ref 12.3–15.4)
FOLATE SERPL-MCNC: >20 NG/ML (ref 4.78–24.2)
GLOBULIN UR ELPH-MCNC: 3.5 GM/DL
GLUCOSE SERPL-MCNC: 202 MG/DL (ref 65–99)
HBA1C MFR BLD: 7.7 % (ref 4.8–5.6)
HCT VFR BLD AUTO: 38 % (ref 34–46.6)
HDLC SERPL-MCNC: 62 MG/DL (ref 40–60)
HGB BLD-MCNC: 13.1 G/DL (ref 12–15.9)
IMM GRANULOCYTES # BLD AUTO: 0.02 10*3/MM3 (ref 0–0.05)
IMM GRANULOCYTES NFR BLD AUTO: 0.3 % (ref 0–0.5)
LDLC SERPL CALC-MCNC: 53 MG/DL (ref 0–100)
LDLC/HDLC SERPL: 0.81 {RATIO}
LYMPHOCYTES # BLD AUTO: 2.52 10*3/MM3 (ref 0.7–3.1)
LYMPHOCYTES NFR BLD AUTO: 36.1 % (ref 19.6–45.3)
MCH RBC QN AUTO: 30.1 PG (ref 26.6–33)
MCHC RBC AUTO-ENTMCNC: 34.5 G/DL (ref 31.5–35.7)
MCV RBC AUTO: 87.4 FL (ref 79–97)
MONOCYTES # BLD AUTO: 0.71 10*3/MM3 (ref 0.1–0.9)
MONOCYTES NFR BLD AUTO: 10.2 % (ref 5–12)
NEUTROPHILS NFR BLD AUTO: 3.56 10*3/MM3 (ref 1.7–7)
NEUTROPHILS NFR BLD AUTO: 51 % (ref 42.7–76)
NRBC BLD AUTO-RTO: 0 /100 WBC (ref 0–0.2)
PLATELET # BLD AUTO: 225 10*3/MM3 (ref 140–450)
PMV BLD AUTO: 11.7 FL (ref 6–12)
POTASSIUM SERPL-SCNC: 4.1 MMOL/L (ref 3.5–5.2)
PROT SERPL-MCNC: 7.8 G/DL (ref 6–8.5)
RBC # BLD AUTO: 4.35 10*6/MM3 (ref 3.77–5.28)
SODIUM SERPL-SCNC: 136 MMOL/L (ref 136–145)
TRIGL SERPL-MCNC: 125 MG/DL (ref 0–150)
TSH SERPL DL<=0.05 MIU/L-ACNC: 2.07 UIU/ML (ref 0.27–4.2)
VIT B12 BLD-MCNC: <150 PG/ML (ref 211–946)
VLDLC SERPL-MCNC: 22 MG/DL (ref 5–40)
WBC NRBC COR # BLD AUTO: 6.98 10*3/MM3 (ref 3.4–10.8)

## 2024-07-29 PROCEDURE — 3078F DIAST BP <80 MM HG: CPT | Performed by: NURSE PRACTITIONER

## 2024-07-29 PROCEDURE — 84443 ASSAY THYROID STIM HORMONE: CPT | Performed by: NURSE PRACTITIONER

## 2024-07-29 PROCEDURE — 3074F SYST BP LT 130 MM HG: CPT | Performed by: NURSE PRACTITIONER

## 2024-07-29 PROCEDURE — 80053 COMPREHEN METABOLIC PANEL: CPT | Performed by: NURSE PRACTITIONER

## 2024-07-29 PROCEDURE — 1159F MED LIST DOCD IN RCRD: CPT | Performed by: NURSE PRACTITIONER

## 2024-07-29 PROCEDURE — G2211 COMPLEX E/M VISIT ADD ON: HCPCS | Performed by: NURSE PRACTITIONER

## 2024-07-29 PROCEDURE — 83036 HEMOGLOBIN GLYCOSYLATED A1C: CPT | Performed by: NURSE PRACTITIONER

## 2024-07-29 PROCEDURE — 82746 ASSAY OF FOLIC ACID SERUM: CPT | Performed by: NURSE PRACTITIONER

## 2024-07-29 PROCEDURE — 36415 COLL VENOUS BLD VENIPUNCTURE: CPT | Performed by: NURSE PRACTITIONER

## 2024-07-29 PROCEDURE — 1160F RVW MEDS BY RX/DR IN RCRD: CPT | Performed by: NURSE PRACTITIONER

## 2024-07-29 PROCEDURE — 85025 COMPLETE CBC W/AUTO DIFF WBC: CPT | Performed by: NURSE PRACTITIONER

## 2024-07-29 PROCEDURE — 1126F AMNT PAIN NOTED NONE PRSNT: CPT | Performed by: NURSE PRACTITIONER

## 2024-07-29 PROCEDURE — 82607 VITAMIN B-12: CPT | Performed by: NURSE PRACTITIONER

## 2024-07-29 PROCEDURE — 80061 LIPID PANEL: CPT | Performed by: NURSE PRACTITIONER

## 2024-07-29 PROCEDURE — 82306 VITAMIN D 25 HYDROXY: CPT | Performed by: NURSE PRACTITIONER

## 2024-07-29 PROCEDURE — 99214 OFFICE O/P EST MOD 30 MIN: CPT | Performed by: NURSE PRACTITIONER

## 2024-07-29 NOTE — ASSESSMENT & PLAN NOTE
Well controlled, continue atenolol and nifedipine. Patient should monitor blood pressure at home and call or return to clinic with consistent elevations greater than 130/80. Labs in clinic today.

## 2024-07-29 NOTE — ASSESSMENT & PLAN NOTE
Likely related to sleep apnea, discussed sleep study. Patient declines. Will check labs today to rule out underlying etiology.

## 2024-07-29 NOTE — PROGRESS NOTES
"Chief Complaint  Diabetes (6 month f/u), Hypertension (F/u), and Nicotine Dependence (F/u)    Subjective      Jeimy Estevez is a 74 y.o. female who presents to [facility]     Fecal incontinence-  Declined stimulator. Started fiber supplement, has helped tremendously. Has not had any accidents, still with have urge but nothing like in the past.     HTN/CHF/HLD-  Continues atenolol, Lasix and nifedipine. She does not check her blood pressure at home. Denies chest pain, headache, leg swelling, shortness of breath. Statin well tolerated, most recent LDL 69. Had follow up with cardiology 3/2023, scheduled in September.      DM2-  Continues metformin and Ozempic through [facility]. Followed up 3/2024. States she would like to stop going to [facility] unless something changes. Most recent A1c 7.3, may have been lower at [facility] but she cannot remember for sure. Admits her glucose levels have been higher, 150s-200s. Denies lows. Diabetic eye exam: 2/2024, [facility]. Diabetic foot exam: 1/2024. Urine microalbumin: 12/2023. Renal protection: Declines. Pneumonia vaccination: Declines.    Reports increase in fatigue. Admits she doesn't sleep well. Her  thinks she has sleep apnea but she does not want to pursue a sleep study.     Mammogram: 3/2023, due  DEXA: 3/2023  Colonoscopy: 2023  COVID19 vaccination: Declines  Shingles vaccination: Declines    Objective   Vital Signs:   Vitals:    07/29/24 1128   BP: 126/78   BP Location: Left arm   Patient Position: Sitting   Cuff Size: Adult   Pulse: 84   Temp: 97.1 °F (36.2 °C)   TempSrc: Temporal   SpO2: 97%   Weight: 83 kg (183 lb)   Height: 165.1 cm (65\")     Body mass index is 30.45 kg/m².    Wt Readings from Last 3 Encounters:   07/29/24 83 kg (183 lb)   05/21/24 83 kg (183 lb)   03/06/24 83.7 kg (184 lb 9.6 oz)     BP Readings from Last 3 Encounters:   07/29/24 126/78   05/21/24 101/58   03/06/24 112/48       Health " Maintenance   Topic Date Due    HEMOGLOBIN A1C  09/12/2024    ZOSTER VACCINE (1 of 2) 07/29/2024 (Originally 3/4/2001)    COVID-19 Vaccine (1 - 2023-24 season) 07/31/2024 (Originally 9/1/2023)    Pneumococcal Vaccine 65+ (1 of 2 - PCV) 07/29/2025 (Originally 3/4/1956)    TDAP/TD VACCINES (1 - Tdap) 07/29/2025 (Originally [birth year]/4/1969)    INFLUENZA VACCINE  08/01/2024    ANNUAL WELLNESS VISIT  12/01/2024    LIPID PANEL  12/01/2024    URINE MICROALBUMIN  12/01/2024    DIABETIC FOOT EXAM  01/11/2025    DIABETIC EYE EXAM  02/28/2025    MAMMOGRAM  03/08/2025    DXA SCAN  03/08/2025    BMI FOLLOWUP  07/29/2025    COLORECTAL CANCER SCREENING  07/10/2028    HEPATITIS C SCREENING  Completed       Physical Exam  Constitutional:       Appearance: Normal appearance.   HENT:      Head: Normocephalic and atraumatic.      Nose: Nose normal.      Mouth/Throat:      Mouth: Mucous membranes are moist.      Pharynx: Oropharynx is clear.   Eyes:      Extraocular Movements: Extraocular movements intact.      Conjunctiva/sclera: Conjunctivae normal.      Pupils: Pupils are equal, round, and reactive to light.   Neck:      Thyroid: No thyroid mass, thyromegaly or thyroid tenderness.   Cardiovascular:      Rate and Rhythm: Normal rate and regular rhythm.      Heart sounds: Normal heart sounds.   Pulmonary:      Effort: Pulmonary effort is normal.      Breath sounds: Normal breath sounds.   Skin:     General: Skin is warm and dry.   Neurological:      General: No focal deficit present.      Mental Status: She is alert and oriented to person, place, and time.   Psychiatric:         Mood and Affect: Mood normal.         Behavior: Behavior normal.         Thought Content: Thought content normal.          Result Review :  The following data was reviewed by: EBONIE Jones on 07/29/2024:         Procedures          Assessment & Plan  Type 2 diabetes mellitus with hyperglycemia, without long-term current use of insulin  Continue metformin and  Ozempic, will consider medication adjustments based on A1c today. She should monitor blood glucose levels closely and call or return to clinic with consistent elevations or frequent lows. Repeat labs in clinic today. Diabetic eye exam: 2/2024. Diabetic foot exam: 1/2024. Urine microalbumin: 12/2023. Renal protection: Declines. Pneumonia vaccination: Declines.  Essential hypertension  Well controlled, continue atenolol and nifedipine. Patient should monitor blood pressure at home and call or return to clinic with consistent elevations greater than 130/80. Labs in clinic today.  Chronic diastolic (congestive) heart failure  Continue Lasix, atenolol. Follow up with cardiology as scheduled.   Hyperlipidemia, unspecified hyperlipidemia type  Continue statin. Repeat lipid panel today.   Incontinence of feces, unspecified fecal incontinence type  Improved, continue fiber.  Fatigue, unspecified type  Likely related to sleep apnea, discussed sleep study. Patient declines. Will check labs today to rule out underlying etiology.   Vitamin D deficiency  Vitamin D level with labs today, not currently taking supplementation.   Visit for screening mammogram  Mammogram ordered.    Orders Placed This Encounter   Procedures    Mammo Screening Digital Tomosynthesis Bilateral With CAD    Comprehensive Metabolic Panel    Hemoglobin A1c    Lipid Panel    TSH    Vitamin B12 & Folate    Vitamin D,25-Hydroxy    CBC Auto Differential    CBC & Differential             FOLLOW UP  Return in about 6 months (around 1/29/2025).  Patient was given instructions and counseling regarding her condition or for health maintenance advice. Please see specific information pulled into the AVS if appropriate.       EBONIE Jones  07/29/24  12:25 EDT    CURRENT & DISCONTINUED MEDICATIONS  Current Outpatient Medications   Medication Instructions    Accu-Chek FastClix Lancets misc USE TO CHECK BLOOD SUGAR ONCE A DAY    Accu-Chek SmartView test strip USE AS  DIRECTED TO TEST FOUR TIMES DAILY AS NEEDED    atenolol (TENORMIN) 50 mg, Oral, Daily    BD Pen Needle Kathy 2nd Gen 32G X 4 MM misc USE ONCE DAILY WITH INJECTIONS    Biotin 5 MG tablet dispersible biotin 5,000 mcg oral tablet,disintegrating dissolve  1 tablet by oral route daily   Active    furosemide (LASIX) 40 mg, Oral, Daily    metFORMIN (GLUCOPHAGE) 1,000 mg, Oral, 2 Times Daily    NIFEdipine CC (ADALAT CC) 60 mg, Oral, Daily    Ozempic (0.25 or 0.5 MG/DOSE) 0.5 mg, Subcutaneous, Weekly    simvastatin (ZOCOR) 40 mg, Oral, Daily       There are no discontinued medications.

## 2024-07-29 NOTE — ASSESSMENT & PLAN NOTE
Continue metformin and Ozempic, will consider medication adjustments based on A1c today. She should monitor blood glucose levels closely and call or return to clinic with consistent elevations or frequent lows. Repeat labs in clinic today. Diabetic eye exam: 2/2024. Diabetic foot exam: 1/2024. Urine microalbumin: 12/2023. Renal protection: Declines. Pneumonia vaccination: Declines.

## 2024-07-30 RX ORDER — LANOLIN ALCOHOL/MO/W.PET/CERES
1000 CREAM (GRAM) TOPICAL DAILY
Qty: 90 TABLET | Refills: 1 | Status: SHIPPED | OUTPATIENT
Start: 2024-07-30

## 2024-07-31 ENCOUNTER — TELEPHONE (OUTPATIENT)
Dept: INTERNAL MEDICINE | Facility: CLINIC | Age: 74
End: 2024-07-31
Payer: MEDICARE

## 2024-07-31 NOTE — TELEPHONE ENCOUNTER
Pt return call to the office, explained lab result comments to pt as well as medication adjustments, pt verbalized understanding and had no further questions at this time.

## 2024-07-31 NOTE — TELEPHONE ENCOUNTER
Caller: Jeimy Estevez    Relationship: Self    Best call back number: [phone]    What test was performed: BLOOD WORK     When was the test performed: 7.29.24    Where was the test performed: IN OFFICE

## 2024-08-06 RX ORDER — SEMAGLUTIDE 1.34 MG/ML
1 INJECTION, SOLUTION SUBCUTANEOUS WEEKLY
Qty: 9 ML | Refills: 1 | Status: SHIPPED | OUTPATIENT
Start: 2024-08-06 | End: 2024-11-04

## 2024-08-15 RX ORDER — ATENOLOL 50 MG/1
50 TABLET ORAL DAILY
Qty: 90 TABLET | Refills: 1 | Status: SHIPPED | OUTPATIENT
Start: 2024-08-15

## 2024-10-04 ENCOUNTER — HOSPITAL ENCOUNTER (OUTPATIENT)
Dept: MAMMOGRAPHY | Facility: HOSPITAL | Age: 74
Discharge: HOME | End: 2024-10-04
Admitting: NURSE PRACTITIONER
Payer: MEDICARE

## 2024-10-04 DIAGNOSIS — Z12.31 VISIT FOR SCREENING MAMMOGRAM: ICD-10-CM

## 2024-10-04 PROCEDURE — 77063 BREAST TOMOSYNTHESIS BI: CPT

## 2024-10-04 PROCEDURE — 77067 SCR MAMMO BI INCL CAD: CPT

## 2024-10-07 ENCOUNTER — TELEPHONE (OUTPATIENT)
Dept: INTERNAL MEDICINE | Facility: CLINIC | Age: 74
End: 2024-10-07
Payer: MEDICARE

## 2024-10-07 NOTE — TELEPHONE ENCOUNTER
Called and spoke with pt, explained results to pt, pt verbalized understanding and had no further questions at this time.

## 2024-10-07 NOTE — TELEPHONE ENCOUNTER
Caller: Jeimy Estevez    Relationship: Self    Best call back number: [phone]     Caller requesting test results: PATIENT    What test was performed: MAMMOGRAM    When was the test performed:     Where was the test performed: 10.4.2024    Additional notes: PATIENT IS UNABLE TO ACCESS HER [employer] CURRENTLY AND IS ASKING FOR A CALL TO DISCUSS RESULTS FROM MAMMOGRAM DONE.

## 2024-11-13 RX ORDER — LANOLIN ALCOHOL/MO/W.PET/CERES
1000 CREAM (GRAM) TOPICAL DAILY
Qty: 90 TABLET | Refills: 1 | Status: SHIPPED | OUTPATIENT
Start: 2024-11-13

## 2024-12-02 ENCOUNTER — OFFICE VISIT (OUTPATIENT)
Dept: INTERNAL MEDICINE | Facility: CLINIC | Age: 74
End: 2024-12-02
Payer: MEDICARE

## 2024-12-02 VITALS
WEIGHT: 177 LBS | HEART RATE: 72 BPM | DIASTOLIC BLOOD PRESSURE: 72 MMHG | SYSTOLIC BLOOD PRESSURE: 114 MMHG | BODY MASS INDEX: 29.49 KG/M2 | OXYGEN SATURATION: 96 % | HEIGHT: 65 IN | TEMPERATURE: 97.2 F

## 2024-12-02 DIAGNOSIS — Z00.00 MEDICARE ANNUAL WELLNESS VISIT, SUBSEQUENT: Primary | ICD-10-CM

## 2024-12-02 DIAGNOSIS — E53.8 B12 DEFICIENCY: ICD-10-CM

## 2024-12-02 DIAGNOSIS — E78.5 HYPERLIPIDEMIA, UNSPECIFIED HYPERLIPIDEMIA TYPE: ICD-10-CM

## 2024-12-02 DIAGNOSIS — I10 ESSENTIAL HYPERTENSION: ICD-10-CM

## 2024-12-02 DIAGNOSIS — E55.9 VITAMIN D DEFICIENCY: ICD-10-CM

## 2024-12-02 DIAGNOSIS — I50.32 CHRONIC DIASTOLIC (CONGESTIVE) HEART FAILURE: ICD-10-CM

## 2024-12-02 DIAGNOSIS — E11.65 TYPE 2 DIABETES MELLITUS WITH HYPERGLYCEMIA, WITHOUT LONG-TERM CURRENT USE OF INSULIN: ICD-10-CM

## 2024-12-02 LAB
25(OH)D3 SERPL-MCNC: 31.1 NG/ML (ref 30–100)
ALBUMIN SERPL-MCNC: 4.4 G/DL (ref 3.5–5.2)
ALBUMIN UR-MCNC: 3.6 MG/DL
ALBUMIN/GLOB SERPL: 1.5 G/DL
ALP SERPL-CCNC: 70 U/L (ref 39–117)
ALT SERPL W P-5'-P-CCNC: 39 U/L (ref 1–33)
ANION GAP SERPL CALCULATED.3IONS-SCNC: 12 MMOL/L (ref 5–15)
AST SERPL-CCNC: 36 U/L (ref 1–32)
BASOPHILS # BLD AUTO: 0.02 10*3/MM3 (ref 0–0.2)
BASOPHILS NFR BLD AUTO: 0.3 % (ref 0–1.5)
BILIRUB SERPL-MCNC: 0.4 MG/DL (ref 0–1.2)
BUN SERPL-MCNC: 17 MG/DL (ref 8–23)
BUN/CREAT SERPL: 17.5 (ref 7–25)
CALCIUM SPEC-SCNC: 10.1 MG/DL (ref 8.6–10.5)
CHLORIDE SERPL-SCNC: 99 MMOL/L (ref 98–107)
CHOLEST SERPL-MCNC: 140 MG/DL (ref 0–200)
CO2 SERPL-SCNC: 27 MMOL/L (ref 22–29)
CREAT SERPL-MCNC: 0.97 MG/DL (ref 0.57–1)
DEPRECATED RDW RBC AUTO: 37.8 FL (ref 37–54)
EGFRCR SERPLBLD CKD-EPI 2021: 61.8 ML/MIN/1.73
EOSINOPHIL # BLD AUTO: 0.04 10*3/MM3 (ref 0–0.4)
EOSINOPHIL NFR BLD AUTO: 0.6 % (ref 0.3–6.2)
ERYTHROCYTE [DISTWIDTH] IN BLOOD BY AUTOMATED COUNT: 12 % (ref 12.3–15.4)
FOLATE SERPL-MCNC: >20 NG/ML (ref 4.78–24.2)
GLOBULIN UR ELPH-MCNC: 3 GM/DL
GLUCOSE SERPL-MCNC: 117 MG/DL (ref 65–99)
HCT VFR BLD AUTO: 39.2 % (ref 34–46.6)
HDLC SERPL-MCNC: 63 MG/DL (ref 40–60)
HGB BLD-MCNC: 13.5 G/DL (ref 12–15.9)
IMM GRANULOCYTES # BLD AUTO: 0.01 10*3/MM3 (ref 0–0.05)
IMM GRANULOCYTES NFR BLD AUTO: 0.1 % (ref 0–0.5)
LDLC SERPL CALC-MCNC: 54 MG/DL (ref 0–100)
LDLC/HDLC SERPL: 0.8 {RATIO}
LYMPHOCYTES # BLD AUTO: 3.58 10*3/MM3 (ref 0.7–3.1)
LYMPHOCYTES NFR BLD AUTO: 49.3 % (ref 19.6–45.3)
MCH RBC QN AUTO: 29.8 PG (ref 26.6–33)
MCHC RBC AUTO-ENTMCNC: 34.4 G/DL (ref 31.5–35.7)
MCV RBC AUTO: 86.5 FL (ref 79–97)
MONOCYTES # BLD AUTO: 0.68 10*3/MM3 (ref 0.1–0.9)
MONOCYTES NFR BLD AUTO: 9.4 % (ref 5–12)
NEUTROPHILS NFR BLD AUTO: 2.93 10*3/MM3 (ref 1.7–7)
NEUTROPHILS NFR BLD AUTO: 40.3 % (ref 42.7–76)
PLATELET # BLD AUTO: 227 10*3/MM3 (ref 140–450)
PMV BLD AUTO: 11.9 FL (ref 6–12)
POTASSIUM SERPL-SCNC: 4.6 MMOL/L (ref 3.5–5.2)
PROT SERPL-MCNC: 7.4 G/DL (ref 6–8.5)
RBC # BLD AUTO: 4.53 10*6/MM3 (ref 3.77–5.28)
SODIUM SERPL-SCNC: 138 MMOL/L (ref 136–145)
TRIGL SERPL-MCNC: 134 MG/DL (ref 0–150)
TSH SERPL DL<=0.05 MIU/L-ACNC: 2.24 UIU/ML (ref 0.27–4.2)
VIT B12 BLD-MCNC: 503 PG/ML (ref 211–946)
VLDLC SERPL-MCNC: 23 MG/DL (ref 5–40)
WBC NRBC COR # BLD AUTO: 7.26 10*3/MM3 (ref 3.4–10.8)

## 2024-12-02 PROCEDURE — 80053 COMPREHEN METABOLIC PANEL: CPT | Performed by: NURSE PRACTITIONER

## 2024-12-02 PROCEDURE — 3078F DIAST BP <80 MM HG: CPT | Performed by: NURSE PRACTITIONER

## 2024-12-02 PROCEDURE — 96160 PT-FOCUSED HLTH RISK ASSMT: CPT | Performed by: NURSE PRACTITIONER

## 2024-12-02 PROCEDURE — G0439 PPPS, SUBSEQ VISIT: HCPCS | Performed by: NURSE PRACTITIONER

## 2024-12-02 PROCEDURE — 82746 ASSAY OF FOLIC ACID SERUM: CPT | Performed by: NURSE PRACTITIONER

## 2024-12-02 PROCEDURE — 99214 OFFICE O/P EST MOD 30 MIN: CPT | Performed by: NURSE PRACTITIONER

## 2024-12-02 PROCEDURE — 3074F SYST BP LT 130 MM HG: CPT | Performed by: NURSE PRACTITIONER

## 2024-12-02 PROCEDURE — 36415 COLL VENOUS BLD VENIPUNCTURE: CPT | Performed by: NURSE PRACTITIONER

## 2024-12-02 PROCEDURE — 1126F AMNT PAIN NOTED NONE PRSNT: CPT | Performed by: NURSE PRACTITIONER

## 2024-12-02 PROCEDURE — 84443 ASSAY THYROID STIM HORMONE: CPT | Performed by: NURSE PRACTITIONER

## 2024-12-02 PROCEDURE — 1159F MED LIST DOCD IN RCRD: CPT | Performed by: NURSE PRACTITIONER

## 2024-12-02 PROCEDURE — 80061 LIPID PANEL: CPT | Performed by: NURSE PRACTITIONER

## 2024-12-02 PROCEDURE — 85025 COMPLETE CBC W/AUTO DIFF WBC: CPT | Performed by: NURSE PRACTITIONER

## 2024-12-02 PROCEDURE — 3051F HG A1C>EQUAL 7.0%<8.0%: CPT | Performed by: NURSE PRACTITIONER

## 2024-12-02 PROCEDURE — 1170F FXNL STATUS ASSESSED: CPT | Performed by: NURSE PRACTITIONER

## 2024-12-02 PROCEDURE — 82043 UR ALBUMIN QUANTITATIVE: CPT | Performed by: NURSE PRACTITIONER

## 2024-12-02 PROCEDURE — 1160F RVW MEDS BY RX/DR IN RCRD: CPT | Performed by: NURSE PRACTITIONER

## 2024-12-02 PROCEDURE — 82607 VITAMIN B-12: CPT | Performed by: NURSE PRACTITIONER

## 2024-12-02 PROCEDURE — 82306 VITAMIN D 25 HYDROXY: CPT | Performed by: NURSE PRACTITIONER

## 2024-12-02 RX ORDER — ATENOLOL 50 MG/1
50 TABLET ORAL DAILY
Qty: 90 TABLET | Refills: 1 | Status: SHIPPED | OUTPATIENT
Start: 2024-12-02

## 2024-12-02 RX ORDER — SEMAGLUTIDE 1.34 MG/ML
INJECTION, SOLUTION SUBCUTANEOUS
COMMUNITY
Start: 2024-10-16

## 2024-12-02 NOTE — ASSESSMENT & PLAN NOTE
Continue Ozempic and metformin. Will repeat A1c and consider further plan of care based on results. She should monitor blood glucose levels closely and call or return to clinic with consistent elevations or frequent lows. Diabetic eye exam: 2/2024. Diabetic foot exam: 2024. Urine microalbumin: Today. Renal protection: Declines. Pneumonia vaccination: Declines.    Orders:    CBC & Differential    Comprehensive Metabolic Panel    Lipid Panel    TSH    MicroAlbumin, Urine, Random - Urine, Clean Catch    Hemoglobin A1c

## 2024-12-02 NOTE — ASSESSMENT & PLAN NOTE
Well controlled, continue atenolol and nifedipine. Patient should monitor blood pressure at home and call or return to clinic with consistent elevations greater than 130/80. Labs in clinic today.    Orders:    TSH

## 2024-12-02 NOTE — PROGRESS NOTES
Subjective   The ABCs of the Annual Wellness Visit  Medicare Wellness Visit      Jeimy Estevez is a 74 y.o. patient who presents for a Medicare Wellness Visit.    The following portions of the patient's history were reviewed and   updated as appropriate: allergies, current medications, past family history, past medical history, past social history, past surgical history, and problem list.    Compared to one year ago, the patient's physical   health is the same.  Compared to one year ago, the patient's mental   health is the same.    Recent Hospitalizations:  She was not admitted to the hospital during the last year.     Current Medical Providers:  Patient Care Team:  Minoo Shirley APRN as PCP - General (Nurse Practitioner)  Leonel Triplett MD as Consulting Physician (Cardiology)  Edi Walsh MD as Consulting Physician (General Surgery)    Outpatient Medications Prior to Visit   Medication Sig Dispense Refill    Accu-Chek FastClix Lancets misc USE TO CHECK BLOOD SUGAR ONCE A DAY      Accu-Chek SmartView test strip USE AS DIRECTED TO TEST FOUR TIMES DAILY AS NEEDED 400 each 1    BD Pen Needle Kathy 2nd Gen 32G X 4 MM misc USE ONCE DAILY WITH INJECTIONS      Biotin 5 MG tablet dispersible biotin 5,000 mcg oral tablet,disintegrating dissolve  1 tablet by oral route daily   Active      furosemide (LASIX) 40 MG tablet Take 1 tablet by mouth Daily.      metFORMIN (GLUCOPHAGE) 500 MG tablet Take 2 tablets by mouth 2 (Two) Times a Day.      NIFEdipine CC (ADALAT CC) 60 MG 24 hr tablet TAKE 1 TABLET BY MOUTH EVERY DAY 90 tablet 3    Ozempic, 1 MG/DOSE, 4 MG/3ML solution pen-injector       simvastatin (ZOCOR) 40 MG tablet TAKE 1 TABLET BY MOUTH DAILY 90 tablet 3    vitamin B-12 (CYANOCOBALAMIN) 1000 MCG tablet TAKE 1 TABLET BY MOUTH DAILY 90 tablet 1    atenolol (TENORMIN) 50 MG tablet TAKE 1 TABLET BY MOUTH DAILY 90 tablet 1     No facility-administered medications prior to visit.     No opioid medication  "identified on active medication list. I have reviewed chart for other potential  high risk medication/s and harmful drug interactions in the elderly.      Aspirin is not on active medication list.  Aspirin use is not indicated based on review of current medical condition/s. Risk of harm outweighs potential benefits.  .    Patient Active Problem List   Diagnosis    Chronic diastolic (congestive) heart failure    Essential hypertension    Type 2 diabetes mellitus with hyperglycemia, without long-term current use of insulin    Medicare annual wellness visit, subsequent    Hyperlipidemia    Colon polyps    Incontinence of feces    Globus sensation    Altered bowel habits    Diverticulosis    Gastroesophageal reflux disease    Hemorrhoids    Oropharyngeal dysphagia    Esophageal dysphagia    Fatigue    Vitamin D deficiency     Advance Care Planning Advance Directive is not on file.  ACP discussion was held with the patient during this visit. Patient has an advance directive (not in EMR), copy requested.            Objective   Vitals:    12/02/24 1054   BP: 114/72   Pulse: 72   Temp: 97.2 °F (36.2 °C)   SpO2: 96%   Weight: 80.3 kg (177 lb)   Height: 165.1 cm (65\")   PainSc: 0-No pain       Estimated body mass index is 29.45 kg/m² as calculated from the following:    Height as of this encounter: 165.1 cm (65\").    Weight as of this encounter: 80.3 kg (177 lb).            Does the patient have evidence of cognitive impairment? No                                                                                                Health  Risk Assessment    Smoking Status:  Social History     Tobacco Use   Smoking Status Never    Passive exposure: Past   Smokeless Tobacco Never     Alcohol Consumption:  Social History     Substance and Sexual Activity   Alcohol Use Never       Fall Risk Screen  STEADI Fall Risk Assessment was completed, and patient is at LOW risk for falls.Assessment completed on:12/2/2024    Depression Screening "   Little interest or pleasure in doing things? Not at all   Feeling down, depressed, or hopeless? Not at all   PHQ-2 Total Score 0      Health Habits and Functional and Cognitive Screenin/2/2024    11:09 AM   Functional & Cognitive Status   Do you have difficulty preparing food and eating? No   Do you have difficulty bathing yourself, getting dressed or grooming yourself? No   Do you have difficulty using the toilet? No   Do you have difficulty moving around from place to place? No   Do you have trouble with steps or getting out of a bed or a chair? No   Current Diet Well Balanced Diet   Dental Exam Up to date   Eye Exam Up to date   Exercise (times per week) 3 times per week   Current Exercises Include House Cleaning   Do you need help using the phone?  No   Are you deaf or do you have serious difficulty hearing?  No   Do you need help to go to places out of walking distance? No   Do you need help shopping? No   Do you need help preparing meals?  No   Do you need help with housework?  No   Do you need help with laundry? No   Do you need help taking your medications? No   Do you need help managing money? No   Do you ever drive or ride in a car without wearing a seat belt? No   Have you felt unusual stress, anger or loneliness in the last month? No   Who do you live with? Spouse   If you need help, do you have trouble finding someone available to you? No   Have you been bothered in the last four weeks by sexual problems? No   Do you have difficulty concentrating, remembering or making decisions? No           Age-appropriate Screening Schedule:  Refer to the list below for future screening recommendations based on patient's age, sex and/or medical conditions. Orders for these recommended tests are listed in the plan section. The patient has been provided with a written plan.    Health Maintenance List  Health Maintenance   Topic Date Due    URINE MICROALBUMIN  2024    HEMOGLOBIN A1C  2025     ZOSTER VACCINE (1 of 2) 12/02/2024 (Originally 3/4/2001)    COVID-19 Vaccine (1 - 2024-25 season) 12/04/2024 (Originally 9/1/2024)    INFLUENZA VACCINE  03/31/2025 (Originally 7/1/2024)    Pneumococcal Vaccine 65+ (1 of 2 - PCV) 07/29/2025 (Originally 3/4/1956)    TDAP/TD VACCINES (1 - Tdap) 07/29/2025 (Originally [birth year]/4/1970)    DIABETIC EYE EXAM  02/28/2025    DXA SCAN  03/08/2025    LIPID PANEL  07/29/2025    BMI FOLLOWUP  10/16/2025    ANNUAL WELLNESS VISIT  12/02/2025    MAMMOGRAM  10/04/2026    COLORECTAL CANCER SCREENING  07/10/2028    HEPATITIS C SCREENING  Completed                                                                                                                                                CMS Preventative Services Quick Reference  Risk Factors Identified During Encounter  Immunizations Discussed/Encouraged: Influenza, Prevnar 20 (Pneumococcal 20-valent conjugate), and COVID19    The above risks/problems have been discussed with the patient.  Pertinent information has been shared with the patient in the After Visit Summary.  An After Visit Summary and PPPS were made available to the patient.    Follow Up:   Next Medicare Wellness visit to be scheduled in 1 year.         Additional E&M Note during same encounter follows:  Patient has additional, significant, and separately identifiable condition(s)/problem(s) that require work above and beyond the Medicare Wellness Visit     Chief Complaint  Medicare Wellness-subsequent    Subjective   HPI  Jeimy is also being seen today for additional medical problem/s.          DM2-  Managed with metformin, Ozempic. Ozempic increased after previous labs with A1c of 7.7. Patient reports home blood glucose readings 150s, did improve with medication adjustment. She has also lost six pounds.  Denies low readings. Diabetic eye exam: 2/2024. Diabetic foot exam: 2024. Urine microalbumin: 12/2023, due. Renal protection: Declines. Pneumonia vaccination:  "Declines.    HLD-  Continues statin.  Most recent LDL 53.    B12 deficiency-  Continues B12 supplement.    HTN/CHF-  Managed with nifedipine, atenolol and Lasix. Patient states she does not plan to follow up with cardiology at this time. Denies chest pain, blurry vision, headache, leg swelling.     Vitamin D deficiency-  She is not currently taking a vitamin D supplement.     Objective   Vital Signs:  /72   Pulse 72   Temp 97.2 °F (36.2 °C)   Ht 165.1 cm (65\")   Wt 80.3 kg (177 lb)   SpO2 96%   BMI 29.45 kg/m²   Physical Exam  Constitutional:       Appearance: Normal appearance.   HENT:      Head: Normocephalic and atraumatic.      Nose: Nose normal.      Mouth/Throat:      Mouth: Mucous membranes are moist.      Pharynx: Oropharynx is clear.   Eyes:      Extraocular Movements: Extraocular movements intact.      Conjunctiva/sclera: Conjunctivae normal.      Pupils: Pupils are equal, round, and reactive to light.   Neck:      Thyroid: No thyroid mass, thyromegaly or thyroid tenderness.   Cardiovascular:      Rate and Rhythm: Normal rate and regular rhythm.      Heart sounds: Normal heart sounds.   Pulmonary:      Effort: Pulmonary effort is normal.      Breath sounds: Normal breath sounds.   Skin:     General: Skin is warm and dry.   Neurological:      General: No focal deficit present.      Mental Status: She is alert and oriented to person, place, and time.   Psychiatric:         Mood and Affect: Mood normal.         Behavior: Behavior normal.         Thought Content: Thought content normal.                       Assessment and Plan            Medicare annual wellness visit, subsequent  Basic labs in clinic today. Encouraged routine dental and eye exams. Discussed age appropriate immunizations, screenings. Age appropriate handout provided, including information on nutrition and physical activity.         Type 2 diabetes mellitus with hyperglycemia, without long-term current use of insulin  Continue " Ozempic and metformin. Will repeat A1c and consider further plan of care based on results. She should monitor blood glucose levels closely and call or return to clinic with consistent elevations or frequent lows. Diabetic eye exam: 2/2024. Diabetic foot exam: 2024. Urine microalbumin: Today. Renal protection: Declines. Pneumonia vaccination: Declines.    Orders:    CBC & Differential    Comprehensive Metabolic Panel    Lipid Panel    TSH    MicroAlbumin, Urine, Random - Urine, Clean Catch    Hemoglobin A1c    Essential hypertension  Well controlled, continue atenolol and nifedipine. Patient should monitor blood pressure at home and call or return to clinic with consistent elevations greater than 130/80. Labs in clinic today.    Orders:    TSH    Chronic diastolic (congestive) heart failure  Continue atenolol, nifedipine, Lasix. Repeat labs today.        Hyperlipidemia, unspecified hyperlipidemia type   Continue statin, lipid panel with labs.       Vitamin D deficiency  Vitamin D with labs today.   Orders:    Vitamin D,25-Hydroxy    B12 deficiency  B12 level today.   Orders:    Vitamin B12 & Folate            Follow Up   Return in about 6 months (around 6/2/2025).  Patient was given instructions and counseling regarding her condition or for health maintenance advice. Please see specific information pulled into the AVS if appropriate.

## 2024-12-09 ENCOUNTER — TELEPHONE (OUTPATIENT)
Dept: INTERNAL MEDICINE | Facility: CLINIC | Age: 74
End: 2024-12-09
Payer: MEDICARE

## 2024-12-09 NOTE — TELEPHONE ENCOUNTER
Called and spoke with pt, explained to pt provider stated she will redraw labs in the future and if liver levels are still high then provider will consider ultrasound if appropriate. Pt verbalized understanding and had no further questions at this time.

## 2024-12-09 NOTE — TELEPHONE ENCOUNTER
Caller: Jeimy Estevez    Relationship: Self    Best call back number: [phone]    What is the best time to reach you: ANY    Who are you requesting to speak with (clinical staff, provider,  specific staff member): CLINICAL    Do you know the name of the person who called: PATIENT    What was the call regarding: PATIENT WAS CALLED ABOUT LABS RELATED TO HER LIVER. SHE WAS TO BE CONTACTED ABOUT FURTHER TESTING AND HAS NOT RECEIVED THIS CALL. PLEASE CALL PATIENT BACK AND ADVISE.    Is it okay if the provider responds through MyChart: NO

## 2024-12-26 RX ORDER — SEMAGLUTIDE 1.34 MG/ML
INJECTION, SOLUTION SUBCUTANEOUS
Qty: 6 ML | Refills: 1 | Status: SHIPPED | OUTPATIENT
Start: 2024-12-26

## 2025-03-06 DIAGNOSIS — E11.65 TYPE 2 DIABETES MELLITUS WITH HYPERGLYCEMIA, WITHOUT LONG-TERM CURRENT USE OF INSULIN: Primary | ICD-10-CM

## 2025-03-06 NOTE — TELEPHONE ENCOUNTER
Patient A1c ordered for December and has not had drawn.  Please schedule her nurse visit to have this done.

## 2025-03-07 RX ORDER — SEMAGLUTIDE 1.34 MG/ML
INJECTION, SOLUTION SUBCUTANEOUS
Qty: 3 ML | Refills: 0 | Status: SHIPPED | OUTPATIENT
Start: 2025-03-07

## 2025-03-11 ENCOUNTER — CLINICAL SUPPORT (OUTPATIENT)
Dept: INTERNAL MEDICINE | Facility: CLINIC | Age: 75
End: 2025-03-11
Payer: MEDICARE

## 2025-03-11 DIAGNOSIS — Z00.00 MEDICARE ANNUAL WELLNESS VISIT, SUBSEQUENT: ICD-10-CM

## 2025-03-11 DIAGNOSIS — E11.65 TYPE 2 DIABETES MELLITUS WITH HYPERGLYCEMIA, WITHOUT LONG-TERM CURRENT USE OF INSULIN: ICD-10-CM

## 2025-03-11 DIAGNOSIS — E53.8 B12 DEFICIENCY: ICD-10-CM

## 2025-03-11 DIAGNOSIS — I50.32 CHRONIC DIASTOLIC (CONGESTIVE) HEART FAILURE: ICD-10-CM

## 2025-03-11 DIAGNOSIS — E78.5 HYPERLIPIDEMIA, UNSPECIFIED HYPERLIPIDEMIA TYPE: ICD-10-CM

## 2025-03-11 DIAGNOSIS — E55.9 VITAMIN D DEFICIENCY: ICD-10-CM

## 2025-03-11 DIAGNOSIS — I10 ESSENTIAL HYPERTENSION: ICD-10-CM

## 2025-03-11 LAB
ALBUMIN UR-MCNC: 8.8 MG/DL
CREAT UR-MCNC: 121 MG/DL
HBA1C MFR BLD: 7.8 % (ref 4.8–5.6)
MICROALBUMIN/CREAT UR: 72.7 MG/G (ref 0–29)

## 2025-03-11 PROCEDURE — 36415 COLL VENOUS BLD VENIPUNCTURE: CPT | Performed by: NURSE PRACTITIONER

## 2025-03-11 PROCEDURE — 82043 UR ALBUMIN QUANTITATIVE: CPT | Performed by: PHYSICIAN ASSISTANT

## 2025-03-11 PROCEDURE — 82570 ASSAY OF URINE CREATININE: CPT | Performed by: PHYSICIAN ASSISTANT

## 2025-03-11 PROCEDURE — 83036 HEMOGLOBIN GLYCOSYLATED A1C: CPT | Performed by: NURSE PRACTITIONER

## 2025-03-11 NOTE — PROGRESS NOTES
Venipuncture Blood Specimen Collection  Venipuncture performed in Right Hand by Kristen Edmonds RN with good hemostasis. Patient tolerated the procedure well without complications.Patient left urine for micro albumin testing while in office.    03/11/25   Kristen Edmonds RN

## 2025-03-24 ENCOUNTER — OFFICE VISIT (OUTPATIENT)
Dept: INTERNAL MEDICINE | Facility: CLINIC | Age: 75
End: 2025-03-24
Payer: MEDICARE

## 2025-03-24 VITALS
TEMPERATURE: 97 F | HEIGHT: 65 IN | OXYGEN SATURATION: 99 % | DIASTOLIC BLOOD PRESSURE: 62 MMHG | SYSTOLIC BLOOD PRESSURE: 108 MMHG | RESPIRATION RATE: 15 BRPM | BODY MASS INDEX: 30.72 KG/M2 | HEART RATE: 75 BPM | WEIGHT: 184.4 LBS

## 2025-03-24 DIAGNOSIS — E11.65 TYPE 2 DIABETES MELLITUS WITH HYPERGLYCEMIA, WITHOUT LONG-TERM CURRENT USE OF INSULIN: Primary | ICD-10-CM

## 2025-03-24 DIAGNOSIS — I10 ESSENTIAL HYPERTENSION: ICD-10-CM

## 2025-03-24 DIAGNOSIS — E78.2 MIXED HYPERLIPIDEMIA: ICD-10-CM

## 2025-03-24 PROCEDURE — 1159F MED LIST DOCD IN RCRD: CPT | Performed by: PHYSICIAN ASSISTANT

## 2025-03-24 PROCEDURE — 99214 OFFICE O/P EST MOD 30 MIN: CPT | Performed by: PHYSICIAN ASSISTANT

## 2025-03-24 PROCEDURE — 3078F DIAST BP <80 MM HG: CPT | Performed by: PHYSICIAN ASSISTANT

## 2025-03-24 PROCEDURE — 1126F AMNT PAIN NOTED NONE PRSNT: CPT | Performed by: PHYSICIAN ASSISTANT

## 2025-03-24 PROCEDURE — 3051F HG A1C>EQUAL 7.0%<8.0%: CPT | Performed by: PHYSICIAN ASSISTANT

## 2025-03-24 PROCEDURE — 3074F SYST BP LT 130 MM HG: CPT | Performed by: PHYSICIAN ASSISTANT

## 2025-03-24 PROCEDURE — 1160F RVW MEDS BY RX/DR IN RCRD: CPT | Performed by: PHYSICIAN ASSISTANT

## 2025-03-24 RX ORDER — DAPAGLIFLOZIN 5 MG/1
5 TABLET, FILM COATED ORAL DAILY
Qty: 30 TABLET | Refills: 5 | Status: SHIPPED | OUTPATIENT
Start: 2025-03-24

## 2025-03-24 NOTE — PROGRESS NOTES
Chief Complaint  Diabetes    Subjective          Jeimy Estevez presents to [facility]  History of Present Illness  DM: she states she has been checking at home in the am  BG ran high (200s) for 1 month but states the past week it has been back in normal range (130-145)  She states she has not changed diet   Denies soda intake. Mostly drinks water or milk   Denies chest pain, palpitations, ha, dizziness, sob  Pt states she had yeast infection on Jardiance       Past Medical History:   Diagnosis Date    CHF (congestive heart failure)     Chronic diastolic (congestive) heart failure 11/01/2021    Colon polyps     Diabetes mellitus     Diverticulosis     Hemorrhoids     Hyperlipidemia     Hypertension     Poor circulation     seems to have resolved        Past Surgical History:   Procedure Laterality Date    APPENDECTOMY      CARDIAC CATHETERIZATION      CHOLECYSTECTOMY      COLONOSCOPY  2020    denzel    COLONOSCOPY N/A 7/10/2023    Procedure: COLONOSCOPY;  Surgeon: Ronald Torres MD;  Location: [facility] ENDOSCOPY;  Service: Gastroenterology;  Laterality: N/A;  diverticulosis    ENDOSCOPY N/A 7/10/2023    Procedure: ESOPHAGOGASTRODUODENOSCOPY with biopsies, dilation with 15- 18 mm balloon;  Surgeon: Ronald Torres MD;  Location: [facility] ENDOSCOPY;  Service: Gastroenterology;  Laterality: N/A;  hiatal hernia, esophageal stricture    TONSILLECTOMY      TUBAL ABDOMINAL LIGATION          Current Outpatient Medications on File Prior to Visit   Medication Sig Dispense Refill    Accu-Chek FastClix Lancets misc USE TO CHECK BLOOD SUGAR ONCE A DAY      Accu-Chek SmartView test strip USE AS DIRECTED TO TEST FOUR TIMES DAILY AS NEEDED 400 each 1    atenolol (TENORMIN) 50 MG tablet Take 1 tablet by mouth Daily. 90 tablet 1    BD Pen Needle Kathy 2nd Gen 32G X 4 MM misc USE ONCE DAILY WITH INJECTIONS      Biotin 5 MG tablet dispersible biotin 5,000 mcg oral  "tablet,disintegrating dissolve  1 tablet by oral route daily   Active      furosemide (LASIX) 40 MG tablet Take 1 tablet by mouth Daily.      NIFEdipine CC (ADALAT CC) 60 MG 24 hr tablet TAKE 1 TABLET BY MOUTH EVERY DAY 90 tablet 3    Semaglutide, 1 MG/DOSE, (Ozempic, 1 MG/DOSE,) 4 MG/3ML solution pen-injector INJECT 1MG UNDER THE SKIN INTO THE APPROPRIATE AREA AS DIRECTED ONE TIME WEEKLY 3 mL 0    simvastatin (ZOCOR) 40 MG tablet TAKE 1 TABLET BY MOUTH DAILY 90 tablet 3    vitamin B-12 (CYANOCOBALAMIN) 1000 MCG tablet TAKE 1 TABLET BY MOUTH DAILY 90 tablet 1     No current facility-administered medications on file prior to visit.        No Known Allergies    Social History     Tobacco Use   Smoking Status Never    Passive exposure: Past   Smokeless Tobacco Never          Objective   Vital Signs:   /62   Pulse 75   Temp 97 °F (36.1 °C) (Temporal)   Resp 15   Ht 165.1 cm (65\")   Wt 83.6 kg (184 lb 6.4 oz)   SpO2 99%   BMI 30.69 kg/m²     Physical Exam  Vitals reviewed.   Constitutional:       Appearance: Normal appearance.   HENT:      Head: Normocephalic and atraumatic.      Nose: Nose normal.      Mouth/Throat:      Mouth: Mucous membranes are moist.   Eyes:      Extraocular Movements: Extraocular movements intact.      Conjunctiva/sclera: Conjunctivae normal.      Pupils: Pupils are equal, round, and reactive to light.   Cardiovascular:      Rate and Rhythm: Normal rate and regular rhythm.   Pulmonary:      Effort: Pulmonary effort is normal.      Breath sounds: Normal breath sounds.   Abdominal:      General: Abdomen is flat. Bowel sounds are normal.      Palpations: Abdomen is soft.   Musculoskeletal:         General: Normal range of motion.   Neurological:      General: No focal deficit present.      Mental Status: She is alert and oriented to person, place, and time.   Psychiatric:         Mood and Affect: Mood normal.        Result Review :   The following data was reviewed by: Lucille Rush, " ACE on 03/24/2025:  Common labs          7/29/2024    12:17 12/2/2024    11:59 12/2/2024    12:18 3/11/2025    09:07   Common Labs   Glucose 202   117     BUN 15   17     Creatinine 0.85   0.97     Sodium 136   138     Potassium 4.1   4.6     Chloride 99   99     Calcium 10.0   10.1     Albumin 4.3   4.4     Total Bilirubin 0.5   0.4     Alkaline Phosphatase 79   70     AST (SGOT) 28   36     ALT (SGPT) 25   39     WBC 6.98   7.26     Hemoglobin 13.1   13.5     Hematocrit 38.0   39.2     Platelets 225   227     Total Cholesterol 137   140     Triglycerides 125   134     HDL Cholesterol 62   63     LDL Cholesterol  53   54     Hemoglobin A1C 7.70    7.80    Microalbumin, Urine  3.6   8.8                          Assessment and Plan    Diagnoses and all orders for this visit:    1. Type 2 diabetes mellitus with hyperglycemia, without long-term current use of insulin (Primary)  Assessment & Plan:  Risks of blood sugar elevation include death, heart attack, stroke, kidney disease, blindness. Discussed importance of medication compliance and not missing doses. Discussed additional medication classes and side effects. Pt had yeast infections with Jardiance. Will try Farxiga and monitor closely for yeast. Discussed increasing water intake can help decrease risk. We will monitor at follow up and adjust medications if still elevated. To er if chest pain, palpitations, vision loss, unilateral weakness, altered mental status. Pt understands and agrees with plan.        2. Essential hypertension  Assessment & Plan:  Hypertension is stable and controlled  Continue current treatment regimen.  Blood pressure will be reassessed in 3 months.      3. Mixed hyperlipidemia  Assessment & Plan:  Cont statin. LDL at goal <70.       Other orders  -     metFORMIN (GLUCOPHAGE) 1000 MG tablet; Take 1 tablet by mouth 2 (Two) Times a Day With Meals.  Dispense: 180 tablet; Refill: 1  -     dapagliflozin (Farxiga) 5 MG tablet tablet; Take 1  tablet by mouth Daily.  Dispense: 30 tablet; Refill: 5        Follow Up   Return in about 3 months (around 6/24/2025).  Patient was given instructions and counseling regarding her condition or for health maintenance advice. Please see specific information pulled into the AVS if appropriate.          This scribe's documentation has been prepared under my direction and personally reviewed by me in its entirety. I confirm that the note above accurately reflects all work, treatment, procedures, and medical decision making performed by me.

## 2025-03-26 NOTE — ASSESSMENT & PLAN NOTE
Risks of blood sugar elevation include death, heart attack, stroke, kidney disease, blindness. Discussed importance of medication compliance and not missing doses. Discussed additional medication classes and side effects. Pt had yeast infections with Jardiance. Will try Farxiga and monitor closely for yeast. Discussed increasing water intake can help decrease risk. We will monitor at follow up and adjust medications if still elevated. To er if chest pain, palpitations, vision loss, unilateral weakness, altered mental status. Pt understands and agrees with plan.

## 2025-03-27 RX ORDER — SEMAGLUTIDE 1.34 MG/ML
INJECTION, SOLUTION SUBCUTANEOUS
Qty: 3 ML | Refills: 2 | Status: SHIPPED | OUTPATIENT
Start: 2025-03-27

## 2025-05-20 RX ORDER — LANOLIN ALCOHOL/MO/W.PET/CERES
1000 CREAM (GRAM) TOPICAL DAILY
Qty: 90 TABLET | Refills: 1 | Status: SHIPPED | OUTPATIENT
Start: 2025-05-20

## 2025-06-02 RX ORDER — ATENOLOL 50 MG/1
50 TABLET ORAL DAILY
Qty: 90 TABLET | Refills: 1 | Status: SHIPPED | OUTPATIENT
Start: 2025-06-02 | End: 2025-06-05 | Stop reason: SDUPTHER

## 2025-06-05 ENCOUNTER — TELEPHONE (OUTPATIENT)
Dept: INTERNAL MEDICINE | Facility: CLINIC | Age: 75
End: 2025-06-05
Payer: MEDICARE

## 2025-06-05 RX ORDER — SEMAGLUTIDE 1.34 MG/ML
INJECTION, SOLUTION SUBCUTANEOUS
Qty: 6 ML | Refills: 1 | Status: SHIPPED | OUTPATIENT
Start: 2025-06-05

## 2025-06-05 RX ORDER — ATENOLOL 50 MG/1
50 TABLET ORAL DAILY
Qty: 90 TABLET | Refills: 1 | Status: SHIPPED | OUTPATIENT
Start: 2025-06-05

## 2025-06-05 NOTE — TELEPHONE ENCOUNTER
Caller: Jeimy Estevez    Relationship: Self    Best call back number: [phone]     What medication are you requesting: atenolol (TENORMIN) 50 MG tablet       If a prescription is needed, what is your preferred pharmacy and phone number: [id number] - [place], [place] - [address]  AT [facility] [address] - [phone]  - [phone] FX     Additional notes:PATIENT STATES THE PHARMACY HAS NOT RECEIVED THE ORDER FOR THIS MEDICATION AND SHE TOOK HER LAST DOSE ON SATURDAY.31.25         Patient calling to inform Dr. Lindsey Blount her Covid test is negative    FYI

## 2025-07-03 ENCOUNTER — OFFICE VISIT (OUTPATIENT)
Dept: INTERNAL MEDICINE | Facility: CLINIC | Age: 75
End: 2025-07-03
Payer: MEDICARE

## 2025-07-03 VITALS
HEIGHT: 64 IN | RESPIRATION RATE: 16 BRPM | DIASTOLIC BLOOD PRESSURE: 84 MMHG | TEMPERATURE: 97.4 F | SYSTOLIC BLOOD PRESSURE: 124 MMHG | OXYGEN SATURATION: 95 % | HEART RATE: 67 BPM | BODY MASS INDEX: 30.25 KG/M2 | WEIGHT: 177.2 LBS

## 2025-07-03 DIAGNOSIS — E11.65 TYPE 2 DIABETES MELLITUS WITH HYPERGLYCEMIA, WITHOUT LONG-TERM CURRENT USE OF INSULIN: Primary | ICD-10-CM

## 2025-07-03 DIAGNOSIS — Z12.31 VISIT FOR SCREENING MAMMOGRAM: ICD-10-CM

## 2025-07-03 DIAGNOSIS — Z78.0 POSTMENOPAUSAL: ICD-10-CM

## 2025-07-03 DIAGNOSIS — I50.32 CHRONIC DIASTOLIC (CONGESTIVE) HEART FAILURE: ICD-10-CM

## 2025-07-03 DIAGNOSIS — E78.2 MIXED HYPERLIPIDEMIA: ICD-10-CM

## 2025-07-03 DIAGNOSIS — E53.8 B12 DEFICIENCY: ICD-10-CM

## 2025-07-03 DIAGNOSIS — E55.9 VITAMIN D DEFICIENCY: ICD-10-CM

## 2025-07-03 DIAGNOSIS — I10 ESSENTIAL HYPERTENSION: ICD-10-CM

## 2025-07-03 LAB
25(OH)D3 SERPL-MCNC: 42.6 NG/ML (ref 30–100)
ALBUMIN SERPL-MCNC: 4.5 G/DL (ref 3.5–5.2)
ALBUMIN UR-MCNC: 1.7 MG/DL
ALBUMIN/GLOB SERPL: 1.3 G/DL
ALP SERPL-CCNC: 64 U/L (ref 39–117)
ALT SERPL W P-5'-P-CCNC: 47 U/L (ref 1–33)
ANION GAP SERPL CALCULATED.3IONS-SCNC: 12.7 MMOL/L (ref 5–15)
AST SERPL-CCNC: 63 U/L (ref 1–32)
BASOPHILS # BLD AUTO: 0.03 10*3/MM3 (ref 0–0.2)
BASOPHILS NFR BLD AUTO: 0.5 % (ref 0–1.5)
BILIRUB SERPL-MCNC: 0.4 MG/DL (ref 0–1.2)
BUN SERPL-MCNC: 14 MG/DL (ref 8–23)
BUN/CREAT SERPL: 15.7 (ref 7–25)
CALCIUM SPEC-SCNC: 10.3 MG/DL (ref 8.6–10.5)
CHLORIDE SERPL-SCNC: 99 MMOL/L (ref 98–107)
CHOLEST SERPL-MCNC: 135 MG/DL (ref 0–200)
CO2 SERPL-SCNC: 24.3 MMOL/L (ref 22–29)
CREAT SERPL-MCNC: 0.89 MG/DL (ref 0.57–1)
DEPRECATED RDW RBC AUTO: 38.4 FL (ref 37–54)
EGFRCR SERPLBLD CKD-EPI 2021: 68.1 ML/MIN/1.73
EOSINOPHIL # BLD AUTO: 0.04 10*3/MM3 (ref 0–0.4)
EOSINOPHIL NFR BLD AUTO: 0.6 % (ref 0.3–6.2)
ERYTHROCYTE [DISTWIDTH] IN BLOOD BY AUTOMATED COUNT: 11.9 % (ref 12.3–15.4)
FOLATE SERPL-MCNC: >20 NG/ML (ref 4.78–24.2)
GLOBULIN UR ELPH-MCNC: 3.6 GM/DL
GLUCOSE SERPL-MCNC: 138 MG/DL (ref 65–99)
HBA1C MFR BLD: 7.3 % (ref 4.8–5.6)
HCT VFR BLD AUTO: 41.7 % (ref 34–46.6)
HDLC SERPL-MCNC: 55 MG/DL (ref 40–60)
HGB BLD-MCNC: 14.4 G/DL (ref 12–15.9)
IMM GRANULOCYTES # BLD AUTO: 0.01 10*3/MM3 (ref 0–0.05)
IMM GRANULOCYTES NFR BLD AUTO: 0.2 % (ref 0–0.5)
LDLC SERPL CALC-MCNC: 54 MG/DL (ref 0–100)
LDLC/HDLC SERPL: 0.88 {RATIO}
LYMPHOCYTES # BLD AUTO: 3.01 10*3/MM3 (ref 0.7–3.1)
LYMPHOCYTES NFR BLD AUTO: 46 % (ref 19.6–45.3)
MCH RBC QN AUTO: 30.6 PG (ref 26.6–33)
MCHC RBC AUTO-ENTMCNC: 34.5 G/DL (ref 31.5–35.7)
MCV RBC AUTO: 88.7 FL (ref 79–97)
MONOCYTES # BLD AUTO: 0.67 10*3/MM3 (ref 0.1–0.9)
MONOCYTES NFR BLD AUTO: 10.2 % (ref 5–12)
NEUTROPHILS NFR BLD AUTO: 2.78 10*3/MM3 (ref 1.7–7)
NEUTROPHILS NFR BLD AUTO: 42.5 % (ref 42.7–76)
NRBC BLD AUTO-RTO: 0 /100 WBC (ref 0–0.2)
PLATELET # BLD AUTO: 231 10*3/MM3 (ref 140–450)
PMV BLD AUTO: 11.8 FL (ref 6–12)
POTASSIUM SERPL-SCNC: 4.9 MMOL/L (ref 3.5–5.2)
PROT SERPL-MCNC: 8.1 G/DL (ref 6–8.5)
RBC # BLD AUTO: 4.7 10*6/MM3 (ref 3.77–5.28)
SODIUM SERPL-SCNC: 136 MMOL/L (ref 136–145)
TRIGL SERPL-MCNC: 158 MG/DL (ref 0–150)
TSH SERPL DL<=0.05 MIU/L-ACNC: 1.78 UIU/ML (ref 0.27–4.2)
VIT B12 BLD-MCNC: 619 PG/ML (ref 211–946)
VLDLC SERPL-MCNC: 26 MG/DL (ref 5–40)
WBC NRBC COR # BLD AUTO: 6.54 10*3/MM3 (ref 3.4–10.8)

## 2025-07-03 PROCEDURE — 82306 VITAMIN D 25 HYDROXY: CPT | Performed by: NURSE PRACTITIONER

## 2025-07-03 PROCEDURE — 82746 ASSAY OF FOLIC ACID SERUM: CPT | Performed by: NURSE PRACTITIONER

## 2025-07-03 PROCEDURE — 80053 COMPREHEN METABOLIC PANEL: CPT | Performed by: NURSE PRACTITIONER

## 2025-07-03 PROCEDURE — 84443 ASSAY THYROID STIM HORMONE: CPT | Performed by: NURSE PRACTITIONER

## 2025-07-03 PROCEDURE — 80061 LIPID PANEL: CPT | Performed by: NURSE PRACTITIONER

## 2025-07-03 PROCEDURE — 85025 COMPLETE CBC W/AUTO DIFF WBC: CPT | Performed by: NURSE PRACTITIONER

## 2025-07-03 PROCEDURE — 82043 UR ALBUMIN QUANTITATIVE: CPT | Performed by: NURSE PRACTITIONER

## 2025-07-03 PROCEDURE — 82607 VITAMIN B-12: CPT | Performed by: NURSE PRACTITIONER

## 2025-07-03 PROCEDURE — 83036 HEMOGLOBIN GLYCOSYLATED A1C: CPT | Performed by: NURSE PRACTITIONER

## 2025-07-03 NOTE — ASSESSMENT & PLAN NOTE
Well controlled, continue atenolol and nifedipine. Patient should monitor blood pressure at home and call or return to clinic with consistent elevations greater than 130/80. Labs in clinic today.    Orders:    CBC & Differential    Comprehensive Metabolic Panel

## 2025-07-03 NOTE — ASSESSMENT & PLAN NOTE
Poorly controlled on previous labs. Continue Ozempic and metformin. Will consider increasing Farxiga based on results of A1c today. She should monitor blood glucose levels closely and call or return to clinic with consistent elevations or frequent lows.  Diabetic eye exam: 3/2025. Diabetic foot exam:  Today. Urine microalbumin: Today. Renal protection: Declines. Pneumonia vaccination: Declines.   Orders:    CBC & Differential    Comprehensive Metabolic Panel    TSH    MicroAlbumin, Urine, Random - Urine, Clean Catch    Hemoglobin A1c

## 2025-07-03 NOTE — PROGRESS NOTES
"Chief Complaint  Diabetes, Hyperlipidemia, and Hypertension    Subjective      Jeimy Estevez is a 75 y.o. female who presents to [facility]     DM2-  Managed with metformin and Ozempic.  Most recent A1c 7.8, Farxiga was added at that time. Blood glucose levels seem to range 130s-190s, does not seem to change with how she eats. Will increase as she gets closer to her next injection of Ozempic.  Diabetic eye exam: 3/2025. Diabetic foot exam: 2024, due. Urine microalbumin: 3/2025, abnormal. Renal protection: Declines. Pneumonia vaccination: Declines.     HLD-  Continues statin. Most recent LDL 54.      B12 deficiency-  Managed with B12 supplement.     HTN/CHF-  Patient managed with nifedipine, atenolol and Lasix.  She does not check her blood pressure routinely at home. She is no longer following with cardiology. Denies chest pain, blurry vision, headache, leg swelling.      Vitamin D deficiency-  Previously managed with supplement, she has been off of this for quite some time.     Elevated liver enzymes-  Noted on most recent labs.    Fecal incontinence-  Continues intermittent episodes despite increasing fiber. GI recommended a stimulator  but she does not want to pursue at this time.     Mammogram: 10/2024  DEXA: 3/2023  Colonoscopy: 7/2023  COVID19 vaccination: Declines  Shingles vaccination: Declines  Pneumonia vaccination: Declines     Objective   Vital Signs:   Vitals:    07/03/25 1105   BP: 124/84   BP Location: Left arm   Patient Position: Sitting   Cuff Size: Adult   Pulse: 67   Resp: 16   Temp: 97.4 °F (36.3 °C)   TempSrc: Temporal   SpO2: 95%   Weight: 80.4 kg (177 lb 3.2 oz)   Height: 161.5 cm (63.58\")     Body mass index is 30.82 kg/m².    Wt Readings from Last 3 Encounters:   07/03/25 80.4 kg (177 lb 3.2 oz)   03/24/25 83.6 kg (184 lb 6.4 oz)   12/02/24 80.3 kg (177 lb)     BP Readings from Last 3 Encounters:   07/03/25 124/84   03/24/25 108/62 "   12/02/24 114/72       Health Maintenance   Topic Date Due    DXA SCAN  03/08/2025    INFLUENZA VACCINE  07/01/2025    HEMOGLOBIN A1C  09/11/2025    Pneumococcal Vaccine 50+ (1 of 2 - PCV) 07/29/2025 (Originally 3/4/1969)    TDAP/TD VACCINES (1 - Tdap) 07/29/2025 (Originally 3/4/1969)    ZOSTER VACCINE (1 of 2) 12/30/2025 (Originally 3/4/2001)    COVID-19 Vaccine (1 - 2024-25 season) 01/03/2026 (Originally 9/1/2024)    ANNUAL WELLNESS VISIT  12/02/2025    LIPID PANEL  12/02/2025    DIABETIC EYE EXAM  03/04/2026    URINE MICROALBUMIN-CREATININE RATIO (uACR)  03/11/2026    DIABETIC FOOT EXAM  07/03/2026    MAMMOGRAM  10/04/2026    COLORECTAL CANCER SCREENING  07/10/2028    HEPATITIS C SCREENING  Completed       Physical Exam  Constitutional:       Appearance: Normal appearance.   HENT:      Head: Normocephalic and atraumatic.      Nose: Nose normal.      Mouth/Throat:      Mouth: Mucous membranes are moist.      Pharynx: Oropharynx is clear.   Eyes:      Extraocular Movements: Extraocular movements intact.      Conjunctiva/sclera: Conjunctivae normal.      Pupils: Pupils are equal, round, and reactive to light.   Neck:      Thyroid: No thyroid mass, thyromegaly or thyroid tenderness.   Cardiovascular:      Rate and Rhythm: Normal rate and regular rhythm.      Heart sounds: Normal heart sounds.   Pulmonary:      Effort: Pulmonary effort is normal.      Breath sounds: Normal breath sounds.   Skin:     General: Skin is warm and dry.   Neurological:      General: No focal deficit present.      Mental Status: She is alert and oriented to person, place, and time.   Psychiatric:         Mood and Affect: Mood normal.         Behavior: Behavior normal.         Thought Content: Thought content normal.          Result Review :  The following data was reviewed by: EBNOIE Jones on 07/03/2025:  Common labs          7/29/2024    12:17 12/2/2024    11:59 12/2/2024    12:18 3/11/2025    09:07   Common Labs   Glucose 202   117      BUN 15   17     Creatinine 0.85   0.97     Sodium 136   138     Potassium 4.1   4.6     Chloride 99   99     Calcium 10.0   10.1     Albumin 4.3   4.4     Total Bilirubin 0.5   0.4     Alkaline Phosphatase 79   70     AST (SGOT) 28   36     ALT (SGPT) 25   39     WBC 6.98   7.26     Hemoglobin 13.1   13.5     Hematocrit 38.0   39.2     Platelets 225   227     Total Cholesterol 137   140     Triglycerides 125   134     HDL Cholesterol 62   63     LDL Cholesterol  53   54     Hemoglobin A1C 7.70    7.80    Microalbumin, Urine  3.6   8.8      A1C Last 3 Results          7/29/2024    12:17 3/11/2025    09:07   HGBA1C Last 3 Results   Hemoglobin A1C 7.70  7.80           Procedures          Assessment & Plan  Type 2 diabetes mellitus with hyperglycemia, without long-term current use of insulin  Poorly controlled on previous labs. Continue Ozempic and metformin. Will consider increasing Farxiga based on results of A1c today. She should monitor blood glucose levels closely and call or return to clinic with consistent elevations or frequent lows.  Diabetic eye exam: 3/2025. Diabetic foot exam:  Today. Urine microalbumin: Today. Renal protection: Declines. Pneumonia vaccination: Declines.   Orders:    CBC & Differential    Comprehensive Metabolic Panel    TSH    MicroAlbumin, Urine, Random - Urine, Clean Catch    Hemoglobin A1c    Mixed hyperlipidemia  Continue statin, lipid panel today.     Orders:    Lipid Panel    Essential hypertension  Well controlled, continue atenolol and nifedipine. Patient should monitor blood pressure at home and call or return to clinic with consistent elevations greater than 130/80. Labs in clinic today.    Orders:    CBC & Differential    Comprehensive Metabolic Panel    Chronic diastolic (congestive) heart failure  Continue Lasix, atenolol and nifedipine.        Vitamin D deficiency  Repeat vitamin D level today.   Orders:    Vitamin D,25-Hydroxy    B12 deficiency  Continue supplement, B12  level today.   Orders:    Vitamin B12 & Folate    Visit for screening mammogram  Mammogram ordered.  Orders:    Mammo Screening Digital Tomosynthesis Bilateral With CAD; Future    Postmenopausal  DEXA ordered.   Orders:    DEXA Bone Density Axial; Future                   FOLLOW UP  Return in about 3 months (around 10/3/2025).  Patient was given instructions and counseling regarding her condition or for health maintenance advice. Please see specific information pulled into the AVS if appropriate.       Minoo Fern, EBONIE  07/03/25  12:50 EDT    CURRENT & DISCONTINUED MEDICATIONS  Current Outpatient Medications   Medication Instructions    Accu-Chek FastClix Lancets misc USE TO CHECK BLOOD SUGAR ONCE A DAY    Accu-Chek SmartView test strip USE AS DIRECTED TO TEST FOUR TIMES DAILY AS NEEDED    atenolol (TENORMIN) 50 mg, Oral, Daily    BD Pen Needle Kathy 2nd Gen 32G X 4 MM misc USE ONCE DAILY WITH INJECTIONS    Biotin 5 MG tablet dispersible biotin 5,000 mcg oral tablet,disintegrating dissolve  1 tablet by oral route daily   Active    dapagliflozin (FARXIGA) 5 mg, Oral, Daily    furosemide (LASIX) 40 mg, Oral, Daily    metFORMIN (GLUCOPHAGE) 1,000 mg, Oral, 2 Times Daily With Meals    NIFEdipine CC (ADALAT CC) 60 mg, Oral, Daily    Semaglutide, 1 MG/DOSE, (Ozempic, 1 MG/DOSE,) 4 MG/3ML solution pen-injector INJECT 1MG UNDER THE SKIN INTO THE APPROPRIATE AREA AS DIRECTED ONE TIME WEEKLY (LABS REQUIRED FOR REFILLS)    simvastatin (ZOCOR) 40 mg, Oral, Daily    vitamin B-12 (CYANOCOBALAMIN) 1,000 mcg, Oral, Daily       There are no discontinued medications.

## 2025-07-10 DIAGNOSIS — R74.8 ELEVATED LIVER ENZYMES: Primary | ICD-10-CM

## 2025-07-10 RX ORDER — DAPAGLIFLOZIN 10 MG/1
10 TABLET, FILM COATED ORAL DAILY
Qty: 90 TABLET | Refills: 1 | Status: SHIPPED | OUTPATIENT
Start: 2025-07-10

## 2025-07-24 ENCOUNTER — HOSPITAL ENCOUNTER (OUTPATIENT)
Dept: ULTRASOUND IMAGING | Facility: HOSPITAL | Age: 75
Discharge: HOME | End: 2025-07-24
Admitting: NURSE PRACTITIONER
Payer: MEDICARE

## 2025-07-24 DIAGNOSIS — R74.8 ELEVATED LIVER ENZYMES: ICD-10-CM

## 2025-07-24 PROCEDURE — 76705 ECHO EXAM OF ABDOMEN: CPT

## 2025-07-28 RX ORDER — SIMVASTATIN 40 MG
40 TABLET ORAL DAILY
Qty: 90 TABLET | Refills: 3 | Status: SHIPPED | OUTPATIENT
Start: 2025-07-28

## 2025-07-28 NOTE — TELEPHONE ENCOUNTER
Patient requested medication refill of simvastatin 40 mg.    Failed protocol for lack of recent or future appointment.    Please advise

## 2025-08-01 ENCOUNTER — TELEPHONE (OUTPATIENT)
Dept: INTERNAL MEDICINE | Facility: CLINIC | Age: 75
End: 2025-08-01
Payer: MEDICARE

## 2025-08-01 NOTE — TELEPHONE ENCOUNTER
"Attempted to call pt back regarding results. LVM to call office back.     Relay     \"Mrs. Estevez,     Your liver ultrasound shows likely fatty liver. Continue to work on dietary changes to improve weight naturally, we will continue to monitor liver enzymes closely.     Have a great day,  Minoo Shirley, EBONIE\"                "

## 2025-08-01 NOTE — TELEPHONE ENCOUNTER
Caller: Jeimy Estevez    Relationship: Self    Best call back number: [phone]     Caller requesting test results: YES    What test was performed:     When was the test performed: 7.24.25    Where was the test performed:     Additional notes: PATIENT STATES SHE HAS BEEN TRYING TO GET THE RESULTS SINCE 7.25.25

## 2025-08-01 NOTE — TELEPHONE ENCOUNTER
Name: Jeimy Estevez      Relationship: Self      Best Callback Number: [phone]       HUB PROVIDED THE RELAY MESSAGE FROM THE OFFICE      PATIENT: VOICED UNDERSTANDING AND HAS NO FURTHER QUESTIONS AT THIS TIME    ADDITIONAL INFORMATION:

## 2025-08-12 ENCOUNTER — TELEPHONE (OUTPATIENT)
Dept: INTERNAL MEDICINE | Facility: CLINIC | Age: 75
End: 2025-08-12
Payer: MEDICARE

## 2025-08-12 DIAGNOSIS — E11.65 TYPE 2 DIABETES MELLITUS WITH HYPERGLYCEMIA, WITHOUT LONG-TERM CURRENT USE OF INSULIN: Primary | ICD-10-CM

## 2025-08-12 RX ORDER — BLOOD SUGAR DIAGNOSTIC
STRIP MISCELLANEOUS
Qty: 400 EACH | Refills: 1 | Status: SHIPPED | OUTPATIENT
Start: 2025-08-12 | End: 2025-08-12 | Stop reason: SDUPTHER

## 2025-08-12 RX ORDER — BLOOD SUGAR DIAGNOSTIC
1 STRIP MISCELLANEOUS 2 TIMES DAILY
Qty: 400 EACH | Refills: 1 | Status: SHIPPED | OUTPATIENT
Start: 2025-08-12

## 2025-08-14 RX ORDER — SEMAGLUTIDE 1.34 MG/ML
INJECTION, SOLUTION SUBCUTANEOUS
Qty: 2 ML | Refills: 3 | Status: SHIPPED | OUTPATIENT
Start: 2025-08-14